# Patient Record
Sex: MALE | Race: WHITE | NOT HISPANIC OR LATINO | Employment: OTHER | URBAN - METROPOLITAN AREA
[De-identification: names, ages, dates, MRNs, and addresses within clinical notes are randomized per-mention and may not be internally consistent; named-entity substitution may affect disease eponyms.]

---

## 2022-08-29 ENCOUNTER — TELEPHONE (OUTPATIENT)
Dept: GASTROENTEROLOGY | Facility: CLINIC | Age: 77
End: 2022-08-29

## 2023-03-17 ENCOUNTER — PREP FOR SURGERY (OUTPATIENT)
Dept: SURGERY | Facility: SURGERY CENTER | Age: 78
End: 2023-03-17
Payer: MEDICARE

## 2023-03-17 DIAGNOSIS — Z12.11 ENCOUNTER FOR SCREENING FOR MALIGNANT NEOPLASM OF COLON: Primary | ICD-10-CM

## 2023-03-19 RX ORDER — SODIUM CHLORIDE, SODIUM LACTATE, POTASSIUM CHLORIDE, CALCIUM CHLORIDE 600; 310; 30; 20 MG/100ML; MG/100ML; MG/100ML; MG/100ML
30 INJECTION, SOLUTION INTRAVENOUS CONTINUOUS PRN
OUTPATIENT
Start: 2023-03-19

## 2023-04-06 PROBLEM — Z12.11 ENCOUNTER FOR SCREENING FOR MALIGNANT NEOPLASM OF COLON: Status: ACTIVE | Noted: 2023-04-06

## 2023-04-19 RX ORDER — METOPROLOL TARTRATE 50 MG/1
50 TABLET, FILM COATED ORAL ONCE
Status: ON HOLD | COMMUNITY
End: 2023-04-20 | Stop reason: SDUPTHER

## 2023-04-19 RX ORDER — ASPIRIN 81 MG/1
81 TABLET ORAL DAILY
COMMUNITY

## 2023-04-19 RX ORDER — LANOLIN ALCOHOL/MO/W.PET/CERES
1000 CREAM (GRAM) TOPICAL DAILY
COMMUNITY

## 2023-04-20 ENCOUNTER — HOSPITAL ENCOUNTER (OUTPATIENT)
Facility: SURGERY CENTER | Age: 78
Setting detail: HOSPITAL OUTPATIENT SURGERY
Discharge: HOME | End: 2023-04-20
Attending: INTERNAL MEDICINE | Admitting: INTERNAL MEDICINE
Payer: MEDICARE

## 2023-04-20 ENCOUNTER — ANESTHESIA (OUTPATIENT)
Dept: SURGERY | Facility: SURGERY CENTER | Age: 78
End: 2023-04-20
Payer: MEDICARE

## 2023-04-20 ENCOUNTER — ANESTHESIA EVENT (OUTPATIENT)
Dept: SURGERY | Facility: SURGERY CENTER | Age: 78
End: 2023-04-20
Payer: MEDICARE

## 2023-04-20 VITALS
HEART RATE: 72 BPM | HEIGHT: 71 IN | SYSTOLIC BLOOD PRESSURE: 120 MMHG | OXYGEN SATURATION: 99 % | TEMPERATURE: 98.2 F | BODY MASS INDEX: 27.5 KG/M2 | RESPIRATION RATE: 16 BRPM | DIASTOLIC BLOOD PRESSURE: 67 MMHG | WEIGHT: 196.4 LBS

## 2023-04-20 DIAGNOSIS — Z12.11 ENCOUNTER FOR SCREENING FOR MALIGNANT NEOPLASM OF COLON: ICD-10-CM

## 2023-04-20 PROCEDURE — 45385 COLONOSCOPY W/LESION REMOVAL: CPT | Performed by: INTERNAL MEDICINE

## 2023-04-20 PROCEDURE — 25010000002 PROPOFOL 10 MG/ML EMULSION: Performed by: ANESTHESIOLOGY

## 2023-04-20 PROCEDURE — 88305 TISSUE EXAM BY PATHOLOGIST: CPT | Performed by: INTERNAL MEDICINE

## 2023-04-20 DEVICE — REPLAY HEMOSTASIS CLIP, 16MM SPAN
Type: IMPLANTABLE DEVICE | Site: TRANSVERSE COLON | Status: FUNCTIONAL
Brand: REPLAY

## 2023-04-20 RX ORDER — PROPOFOL 10 MG/ML
VIAL (ML) INTRAVENOUS AS NEEDED
Status: DISCONTINUED | OUTPATIENT
Start: 2023-04-20 | End: 2023-04-20 | Stop reason: SURG

## 2023-04-20 RX ORDER — LIDOCAINE HYDROCHLORIDE 20 MG/ML
INJECTION, SOLUTION INFILTRATION; PERINEURAL AS NEEDED
Status: DISCONTINUED | OUTPATIENT
Start: 2023-04-20 | End: 2023-04-20 | Stop reason: SURG

## 2023-04-20 RX ORDER — SODIUM CHLORIDE, SODIUM LACTATE, POTASSIUM CHLORIDE, CALCIUM CHLORIDE 600; 310; 30; 20 MG/100ML; MG/100ML; MG/100ML; MG/100ML
30 INJECTION, SOLUTION INTRAVENOUS CONTINUOUS PRN
Status: DISCONTINUED | OUTPATIENT
Start: 2023-04-20 | End: 2023-04-20 | Stop reason: HOSPADM

## 2023-04-20 RX ORDER — MAGNESIUM HYDROXIDE 1200 MG/15ML
LIQUID ORAL AS NEEDED
Status: DISCONTINUED | OUTPATIENT
Start: 2023-04-20 | End: 2023-04-20 | Stop reason: HOSPADM

## 2023-04-20 RX ORDER — METOPROLOL SUCCINATE 50 MG/1
TABLET, EXTENDED RELEASE ORAL
COMMUNITY
Start: 2023-01-12

## 2023-04-20 RX ADMIN — LIDOCAINE HYDROCHLORIDE 60 MG: 20 INJECTION, SOLUTION INFILTRATION; PERINEURAL at 11:56

## 2023-04-20 RX ADMIN — PROPOFOL 140 MCG/KG/MIN: 10 INJECTION, EMULSION INTRAVENOUS at 11:56

## 2023-04-20 RX ADMIN — SODIUM CHLORIDE, POTASSIUM CHLORIDE, SODIUM LACTATE AND CALCIUM CHLORIDE 30 ML/HR: 600; 310; 30; 20 INJECTION, SOLUTION INTRAVENOUS at 11:08

## 2023-04-20 RX ADMIN — PROPOFOL 90 MG: 10 INJECTION, EMULSION INTRAVENOUS at 11:56

## 2023-04-20 NOTE — ANESTHESIA PREPROCEDURE EVALUATION
" Anesthesia Evaluation     Patient summary reviewed and Nursing notes reviewed   NPO Solid Status: > 8 hours  NPO Liquid Status: > 2 hours           Airway   Mallampati: II  TM distance: >3 FB  Neck ROM: full  No difficulty expected  Dental      Pulmonary    (+) sleep apnea,   Cardiovascular     (+) hypertension,       Neuro/Psych  GI/Hepatic/Renal/Endo      Musculoskeletal     Abdominal    Substance History      OB/GYN          Other   arthritis,                      Anesthesia Plan    ASA 2     MAC     (I have reviewed the patient's history and chart with the patient, including all pertinent laboratory results and imaging. I have explained the risks of anesthesia including but not limited to dental damage, corneal abrasion, nerve injury, MI, stroke, aspiration, and death. Patient has agreed to proceed.     Ht 180.3 cm (71\")   Wt 88.5 kg (195 lb)   BMI 27.20 kg/m²   )  intravenous induction     Anesthetic plan, risks, benefits, and alternatives have been provided, discussed and informed consent has been obtained with: patient.        CODE STATUS:       "

## 2023-04-20 NOTE — DISCHARGE INSTRUCTIONS
____6___ clip(s) was placed in your body on ____4/20/23__________________ to control bleeding in your GI tract.  If scheduled for an MRI, please inform the technologist you should have an X-ray prior to your MRI to confirm the metal clip has passed.

## 2023-04-20 NOTE — ANESTHESIA POSTPROCEDURE EVALUATION
Patient: Cortez Delgado    Procedure Summary     Date: 04/20/23 Room / Location[facility]    Anesthesia Start: 1154 Anesthesia Stop: 1239    Procedure: COLONOSCOPY WITH POLYPECTOMY Diagnosis:       Encounter for screening for malignant neoplasm of colon      (Encounter for screening for malignant neoplasm of colon [Z12.11])    Surgeons: David Ryder MD Provider: Kasie Diaz MD    Anesthesia Type: MAC ASA Status: 2          Anesthesia Type: MAC    Vitals  Vitals Value Taken Time   /67 04/20/23 1253   Temp 36.8 °C (98.2 °F) 04/20/23 1240   Pulse 72 04/20/23 1253   Resp 16 04/20/23 1253   SpO2 99 % 04/20/23 1253           Post Anesthesia Care and Evaluation    Patient location during evaluation: bedside  Patient participation: complete - patient participated  Level of consciousness: awake and alert  Pain management: adequate    Airway patency: patent  Anesthetic complications: No anesthetic complications  PONV Status: controlled  Cardiovascular status: acceptable  Respiratory status: acceptable  Hydration status: acceptable

## 2023-04-20 NOTE — H&P
No chief complaint on file.      HPI  Patient here today for a screening colonoscopy.  He had a positive Cologuard.         Problem List:    Patient Active Problem List   Diagnosis   • Encounter for screening for malignant neoplasm of colon       Medical History:    Past Medical History:   Diagnosis Date   • Arthritis    • Hypertension    • Sleep apnea         Social History:    Social History     Socioeconomic History   • Marital status:    Tobacco Use   • Smoking status: Never   • Smokeless tobacco: Never   Substance and Sexual Activity   • Drug use: Never       Family History: History reviewed. No pertinent family history.    Surgical History:   Past Surgical History:   Procedure Laterality Date   • FRACTURE SURGERY         No current facility-administered medications for this encounter.    Allergies:   Allergies   Allergen Reactions   • Penicillins Other (See Comments)     Please ask patient reaction        The following portions of the patient's history were reviewed by me and updated as appropriate: review of systems, allergies, current medications, past family history, past medical history, past social history, past surgical history and problem list.    There were no vitals filed for this visit.    PHYSICAL EXAM:    CONSTITUTIONAL:  today's vital signs reviewed by me  GASTROINTESTINAL: abdomen is soft nontender nondistended with normal active bowel sounds, no masses are appreciated    Assessment/ Plan  We will proceed with screening colonoscopy.    Risks and benefits as well as alternatives to endoscopic evaluation were explained to the patient and they voiced understanding and wish to proceed.  These risks include but are not limited to the risk of bleeding, perforation, adverse reaction to sedation, and missed lesions.  The patient was given the opportunity to ask questions prior to the endoscopic procedure.

## 2023-04-21 LAB
LAB AP CASE REPORT: NORMAL
LAB AP CLINICAL INFORMATION: NORMAL
PATH REPORT.FINAL DX SPEC: NORMAL
PATH REPORT.GROSS SPEC: NORMAL

## 2023-08-28 ENCOUNTER — TELEPHONE (OUTPATIENT)
Dept: GASTROENTEROLOGY | Facility: CLINIC | Age: 78
End: 2023-08-28
Payer: MEDICARE

## 2023-08-29 ENCOUNTER — TELEPHONE (OUTPATIENT)
Dept: GASTROENTEROLOGY | Facility: CLINIC | Age: 78
End: 2023-08-29
Payer: MEDICARE

## 2023-08-30 ENCOUNTER — PREP FOR SURGERY (OUTPATIENT)
Dept: SURGERY | Facility: SURGERY CENTER | Age: 78
End: 2023-08-30
Payer: MEDICARE

## 2023-08-30 DIAGNOSIS — Z12.11 ENCOUNTER FOR SCREENING FOR MALIGNANT NEOPLASM OF COLON: Primary | ICD-10-CM

## 2023-08-30 DIAGNOSIS — Z86.010 PERSONAL HISTORY OF COLONIC POLYPS: ICD-10-CM

## 2023-09-14 PROBLEM — Z86.0100 PERSONAL HISTORY OF COLONIC POLYPS: Status: ACTIVE | Noted: 2023-09-14

## 2023-09-14 PROBLEM — Z86.010 PERSONAL HISTORY OF COLONIC POLYPS: Status: ACTIVE | Noted: 2023-09-14

## 2023-10-30 ENCOUNTER — HOSPITAL ENCOUNTER (OUTPATIENT)
Facility: SURGERY CENTER | Age: 78
Setting detail: HOSPITAL OUTPATIENT SURGERY
Discharge: HOME | End: 2023-10-30
Attending: INTERNAL MEDICINE | Admitting: INTERNAL MEDICINE
Payer: MEDICARE

## 2023-10-30 ENCOUNTER — ANESTHESIA (OUTPATIENT)
Dept: SURGERY | Facility: SURGERY CENTER | Age: 78
End: 2023-10-30
Payer: MEDICARE

## 2023-10-30 ENCOUNTER — ANESTHESIA EVENT (OUTPATIENT)
Dept: SURGERY | Facility: SURGERY CENTER | Age: 78
End: 2023-10-30
Payer: MEDICARE

## 2023-10-30 VITALS
BODY MASS INDEX: 27.13 KG/M2 | DIASTOLIC BLOOD PRESSURE: 97 MMHG | TEMPERATURE: 98 F | HEIGHT: 71 IN | RESPIRATION RATE: 16 BRPM | WEIGHT: 193.8 LBS | SYSTOLIC BLOOD PRESSURE: 128 MMHG | OXYGEN SATURATION: 96 % | HEART RATE: 67 BPM

## 2023-10-30 DIAGNOSIS — Z86.010 PERSONAL HISTORY OF COLONIC POLYPS: ICD-10-CM

## 2023-10-30 DIAGNOSIS — Z12.11 ENCOUNTER FOR SCREENING FOR MALIGNANT NEOPLASM OF COLON: ICD-10-CM

## 2023-10-30 PROCEDURE — 45385 COLONOSCOPY W/LESION REMOVAL: CPT | Performed by: INTERNAL MEDICINE

## 2023-10-30 PROCEDURE — 25810000003 LACTATED RINGERS PER 1000 ML: Performed by: ANESTHESIOLOGY

## 2023-10-30 PROCEDURE — 25010000002 PROPOFOL 10 MG/ML EMULSION: Performed by: ANESTHESIOLOGY

## 2023-10-30 PROCEDURE — 88305 TISSUE EXAM BY PATHOLOGIST: CPT | Performed by: INTERNAL MEDICINE

## 2023-10-30 PROCEDURE — 25010000002 PROPOFOL 1000 MG/100ML EMULSION: Performed by: ANESTHESIOLOGY

## 2023-10-30 PROCEDURE — 25010000002 LIDOCAINE 1 % SOLUTION: Performed by: ANESTHESIOLOGY

## 2023-10-30 PROCEDURE — 45380 COLONOSCOPY AND BIOPSY: CPT | Performed by: INTERNAL MEDICINE

## 2023-10-30 RX ORDER — SODIUM CHLORIDE 0.9 % (FLUSH) 0.9 %
10 SYRINGE (ML) INJECTION AS NEEDED
Status: DISCONTINUED | OUTPATIENT
Start: 2023-10-30 | End: 2023-10-30 | Stop reason: HOSPADM

## 2023-10-30 RX ORDER — LIDOCAINE HYDROCHLORIDE 10 MG/ML
0.5 INJECTION, SOLUTION INFILTRATION; PERINEURAL ONCE AS NEEDED
Status: DISCONTINUED | OUTPATIENT
Start: 2023-10-30 | End: 2023-10-30 | Stop reason: HOSPADM

## 2023-10-30 RX ORDER — SODIUM CHLORIDE, SODIUM LACTATE, POTASSIUM CHLORIDE, CALCIUM CHLORIDE 600; 310; 30; 20 MG/100ML; MG/100ML; MG/100ML; MG/100ML
30 INJECTION, SOLUTION INTRAVENOUS CONTINUOUS PRN
Status: DISCONTINUED | OUTPATIENT
Start: 2023-10-30 | End: 2023-10-30 | Stop reason: HOSPADM

## 2023-10-30 RX ORDER — LIDOCAINE HYDROCHLORIDE 10 MG/ML
INJECTION, SOLUTION INFILTRATION; PERINEURAL AS NEEDED
Status: DISCONTINUED | OUTPATIENT
Start: 2023-10-30 | End: 2023-10-30 | Stop reason: SURG

## 2023-10-30 RX ORDER — SODIUM CHLORIDE, SODIUM LACTATE, POTASSIUM CHLORIDE, CALCIUM CHLORIDE 600; 310; 30; 20 MG/100ML; MG/100ML; MG/100ML; MG/100ML
1000 INJECTION, SOLUTION INTRAVENOUS CONTINUOUS
Status: DISCONTINUED | OUTPATIENT
Start: 2023-10-30 | End: 2023-10-30 | Stop reason: HOSPADM

## 2023-10-30 RX ORDER — PROPOFOL 10 MG/ML
INJECTION, EMULSION INTRAVENOUS AS NEEDED
Status: DISCONTINUED | OUTPATIENT
Start: 2023-10-30 | End: 2023-10-30 | Stop reason: SURG

## 2023-10-30 RX ORDER — SODIUM CHLORIDE, SODIUM LACTATE, POTASSIUM CHLORIDE, CALCIUM CHLORIDE 600; 310; 30; 20 MG/100ML; MG/100ML; MG/100ML; MG/100ML
INJECTION, SOLUTION INTRAVENOUS CONTINUOUS PRN
Status: DISCONTINUED | OUTPATIENT
Start: 2023-10-30 | End: 2023-10-30 | Stop reason: SURG

## 2023-10-30 RX ORDER — SODIUM CHLORIDE 0.9 % (FLUSH) 0.9 %
3 SYRINGE (ML) INJECTION EVERY 12 HOURS SCHEDULED
Status: DISCONTINUED | OUTPATIENT
Start: 2023-10-30 | End: 2023-10-30 | Stop reason: HOSPADM

## 2023-10-30 RX ADMIN — LIDOCAINE HYDROCHLORIDE 50 MG: 10 INJECTION, SOLUTION INFILTRATION; PERINEURAL at 11:51

## 2023-10-30 RX ADMIN — SODIUM CHLORIDE, SODIUM LACTATE, POTASSIUM CHLORIDE, AND CALCIUM CHLORIDE: .6; .31; .03; .02 INJECTION, SOLUTION INTRAVENOUS at 11:49

## 2023-10-30 RX ADMIN — PROPOFOL 80 MG: 10 INJECTION, EMULSION INTRAVENOUS at 11:51

## 2023-10-30 RX ADMIN — PROPOFOL 140 MCG/KG/MIN: 10 INJECTION, EMULSION INTRAVENOUS at 11:52

## 2023-10-30 NOTE — H&P
No chief complaint on file.      HPI  Patient today for screening colonoscopy.  He has a history of multiple, advanced colon polyps.         Problem List:    Patient Active Problem List   Diagnosis    Encounter for screening for malignant neoplasm of colon    Personal history of colonic polyps       Medical History:    Past Medical History:   Diagnosis Date    Arthritis     Colon polyps     Hypertension     Sleep apnea     cpap        Social History:    Social History     Socioeconomic History    Marital status:    Tobacco Use    Smoking status: Never    Smokeless tobacco: Never   Vaping Use    Vaping Use: Never used   Substance and Sexual Activity    Alcohol use: Never    Drug use: Never    Sexual activity: Defer       Family History: History reviewed. No pertinent family history.    Surgical History:   Past Surgical History:   Procedure Laterality Date    COLONOSCOPY      COLONOSCOPY W/ POLYPECTOMY N/A 04/20/2023    Procedure: COLONOSCOPY WITH POLYPECTOMY;  Surgeon: David Ryder MD;  Location: [facility] OR;  Service: Gastroenterology;  Laterality: N/A;  DIVERTICULOSIS, POLYPS X4, HEMORRHOIDS    FRACTURE SURGERY         No current facility-administered medications for this encounter.    Allergies:   Allergies   Allergen Reactions    Penicillins Other (See Comments)     Please ask patient reaction        The following portions of the patient's history were reviewed by me and updated as appropriate: review of systems, allergies, current medications, past family history, past medical history, past social history, past surgical history and problem list.    There were no vitals filed for this visit.    PHYSICAL EXAM:    CONSTITUTIONAL:  today's vital signs reviewed by me  GASTROINTESTINAL: abdomen is soft nontender nondistended with normal active bowel sounds, no masses are appreciated    Assessment/ Plan  We will proceed today with colonoscopy.    Risks and benefits as well as alternatives to endoscopic  evaluation were explained to the patient and they voiced understanding and wish to proceed.  These risks include but are not limited to the risk of bleeding, perforation, adverse reaction to sedation, and missed lesions.  The patient was given the opportunity to ask questions prior to the endoscopic procedure.

## 2023-10-30 NOTE — ANESTHESIA PREPROCEDURE EVALUATION
Anesthesia Evaluation     Patient summary reviewed and Nursing notes reviewed   NPO Solid Status: > 8 hours  NPO Liquid Status: > 2 hours           Airway   Mallampati: II  TM distance: >3 FB  Neck ROM: full  No difficulty expected  Dental      Pulmonary    (+) ,sleep apnea  Cardiovascular     (+) hypertension      Neuro/Psych  GI/Hepatic/Renal/Endo      Musculoskeletal     Abdominal    Substance History      OB/GYN          Other   arthritis,                       Anesthesia Plan    ASA 2     MAC     (I have reviewed the patient's history and chart with the patient, including all pertinent laboratory results and imaging. I have explained the risks of anesthesia including but not limited to dental damage, corneal abrasion, nerve injury, MI, stroke, aspiration, and death. Patient has agreed to proceed.       )  intravenous induction     Anesthetic plan, risks, benefits, and alternatives have been provided, discussed and informed consent has been obtained with: patient.        CODE STATUS:

## 2023-10-30 NOTE — ANESTHESIA POSTPROCEDURE EVALUATION
"Patient: Cortez Delgado    Procedure Summary       Date: 10/30/23 Room [facility]    Anesthesia Start: 1149 Anesthesia Stop: 1211    Procedure: COLONOSCOPY TO CECUM Diagnosis:       Encounter for screening for malignant neoplasm of colon      Personal history of colonic polyps      (Encounter for screening for malignant neoplasm of colon [Z12.11])      (Personal history of colonic polyps [Z86.010])    Surgeons: David Ryder MD Provider: Elizabeth Green MD    Anesthesia Type: MAC ASA Status: 2            Anesthesia Type: MAC    Vitals  Vitals Value Taken Time   /76 10/30/23 1221   Temp 36.7 °C (98 °F) 10/30/23 1211   Pulse 50 10/30/23 1221   Resp 16 10/30/23 1221   SpO2 95 % 10/30/23 1221           Post Anesthesia Care and Evaluation    Patient location during evaluation: bedside  Patient participation: complete - patient participated  Level of consciousness: awake  Pain management: adequate    Airway patency: patent  Anesthetic complications: No anesthetic complications    Cardiovascular status: acceptable  Respiratory status: acceptable  Hydration status: acceptable    Comments: /76   Pulse 50   Temp 36.7 °C (98 °F) (Temporal)   Resp 16   Ht 180.3 cm (71\")   Wt 87.9 kg (193 lb 12.8 oz)   SpO2 95%   BMI 27.03 kg/m²     "

## 2023-11-08 ENCOUNTER — TELEPHONE (OUTPATIENT)
Dept: GASTROENTEROLOGY | Facility: CLINIC | Age: 78
End: 2023-11-08
Payer: MEDICARE

## 2024-02-27 ENCOUNTER — TELEPHONE (OUTPATIENT)
Dept: SPORTS MEDICINE | Facility: CLINIC | Age: 79
End: 2024-02-27
Payer: MEDICARE

## 2024-02-27 NOTE — TELEPHONE ENCOUNTER
Patient came to office with disc from [facility] says xrays done 2/13/24. Brought [facility] Report as well. Placed both in Dr. White's basked at [facility] location.

## 2024-03-14 ENCOUNTER — OFFICE VISIT (OUTPATIENT)
Dept: ORTHOPEDIC SURGERY | Facility: CLINIC | Age: 79
End: 2024-03-14
Payer: MEDICARE

## 2024-03-14 VITALS
DIASTOLIC BLOOD PRESSURE: 70 MMHG | HEIGHT: 71 IN | SYSTOLIC BLOOD PRESSURE: 146 MMHG | WEIGHT: 199 LBS | BODY MASS INDEX: 27.86 KG/M2

## 2024-03-14 DIAGNOSIS — M19.011 PRIMARY OSTEOARTHRITIS OF RIGHT SHOULDER: Primary | ICD-10-CM

## 2024-03-14 PROCEDURE — 99203 OFFICE O/P NEW LOW 30 MIN: CPT | Performed by: ORTHOPAEDIC SURGERY

## 2024-03-14 PROCEDURE — 20610 DRAIN/INJ JOINT/BURSA W/O US: CPT | Performed by: ORTHOPAEDIC SURGERY

## 2024-03-14 PROCEDURE — 1160F RVW MEDS BY RX/DR IN RCRD: CPT | Performed by: ORTHOPAEDIC SURGERY

## 2024-03-14 PROCEDURE — 1159F MED LIST DOCD IN RCRD: CPT | Performed by: ORTHOPAEDIC SURGERY

## 2024-03-14 RX ADMIN — LIDOCAINE HYDROCHLORIDE 4 ML: 10 INJECTION, SOLUTION EPIDURAL; INFILTRATION; INTRACAUDAL; PERINEURAL at 11:41

## 2024-03-14 RX ADMIN — TRIAMCINOLONE ACETONIDE 80 MG: 40 INJECTION, SUSPENSION INTRA-ARTICULAR; INTRAMUSCULAR at 11:41

## 2024-03-14 NOTE — PROGRESS NOTES
Subjective:     Patient ID: Cortez Delgado is a 78 y.o. male.    Chief Complaint:  Right shoulder pain, new patient    History of Present Illness  Cortez Delgado presents to clinic today for evaluation of right shoulder pain.    Right shoulder  The pain has been present for greater than 20 years, localized to the anterior and posterior glenohumeral joint line. No significant crepitus on range of motion. It is worse with overhead lifting, pushing, pulling activities. He denies any numbness or tingling. Mild radiation of pain down the lateral aspect of the upper extremity. He has had mild improvement with anti-inflammatory medications, activity modification. He has had no prior injections. He notes significant crepitus on range of motion. He denies any fevers, chills, or sweats.     Social History     Occupational History    Not on file   Tobacco Use    Smoking status: Never     Passive exposure: Never    Smokeless tobacco: Never   Vaping Use    Vaping status: Never Used   Substance and Sexual Activity    Alcohol use: Never    Drug use: Never    Sexual activity: Defer      Past Medical History:   Diagnosis Date    Arthritis     Colon polyps     Fracture of wrist     Approx 12 yrs ago    Hypertension     Knee swelling     Periarthritis of shoulder     Sleep apnea     cpap    Tendinitis of knee      Past Surgical History:   Procedure Laterality Date    COLONOSCOPY      COLONOSCOPY N/A 10/30/2023    Procedure: COLONOSCOPY TO CECUM;  Surgeon: David Ryder MD;  [place] [facility] [place]  Service: Gastroenterology;  Laterality: N/A;  diverticulosis, polyps,hemorrhoids    COLONOSCOPY W/ POLYPECTOMY N/A 04/20/2023    Procedure: COLONOSCOPY WITH POLYPECTOMY;  Surgeon: David Ryder MD;  [place] [facility] [place];  Service: Gastroenterology;  Laterality: N/A;  DIVERTICULOSIS, POLYPS X4, HEMORRHOIDS    FRACTURE SURGERY      WRIST SURGERY Left     Approx 12 yrs ago       History reviewed. No pertinent family  "history.      Review of Systems        Objective:  Vitals:    03/14/24 1041   BP: 146/70   Weight: 90.3 kg (199 lb)   Height: 180.3 cm (71\")         03/14/24  1041   Weight: 90.3 kg (199 lb)     Body mass index is 27.75 kg/m².  Physical Exam    Vital signs reviewed.   General: No acute distress, alert and oriented  Eyes: conjunctiva clear; pupils equally round and reactive  ENT: external ears and nose atraumatic; oropharynx clear  CV: no peripheral edema  Resp: normal respiratory effort  Skin: no rashes or wounds; normal turgor  Psych: mood and affect appropriate; recent and remote memory intact        Ortho Exam     Right shoulder:  Active and passive forward flexion 140 degrees  4/5 strength  Active and passive external rotation 30 degrees  4/5 strength, internal rotation L2  4/5 strength, Belly press test negative.   Maximal tenderness to palpation anterior, posterior glenohumeral joint.   Almost significant crepitus on range of motion.   Positive sensation to light touch in all distributions, right hand, symmetric to left.   Brisk capillary refill to all digits. 2+ radial pulse, right wrist.    Imaging:            Review of outside x-rays right shoulder and humerus including review of imaging as well as radiology report indicates end-stage arthritic change of the right glenohumeral joint with significant reactive osteophyte formation, moderate humeral head elevation.      Assessment:        1. Primary osteoarthritis of right shoulder           Plan:     Large Joint Arthrocentesis: R glenohumeral  Date/Time: 3/14/2024 11:41 AM  Consent given by: patient  Site marked: site marked  Timeout: Immediately prior to procedure a time out was called to verify the correct patient, procedure, equipment, support staff and site/side marked as required   Supporting Documentation  Indications: pain   Procedure Details  Location: shoulder - R glenohumeral  Preparation: Patient was prepped and draped in the usual sterile " fashion  Needle size: 22 G  Approach: anterior  Medications administered: 4 mL lidocaine PF 1% 1 %; 80 mg triamcinolone acetonide 40 MG/ML  Patient tolerance: patient tolerated the procedure well with no immediate complications        The patient would like to proceed with cortisone injection today to the right shoulder. Recommended limited use of affected extremity for the next 24 hours to only essential activities other than work on general active and passive motion. Recommended supplementing with ice and soft tissue massage. Discussed with the patient that they should see results in 5-7 days, if no improvement in 5-6 weeks I have asked them to call the office to review other options. Patient should call the office immediately if they notice redness, warmth, fevers, chills, or residual numbness or tingling for greater than 6 hours after injection.      Discussed treatment options at length with the patient.  Given his advanced degenerative changes to his right shoulder, we discussed options including but not limited to injection, observation, physical therapy, and reverse shoulder arthroplasty.   At this point in time, he would like to proceed with an injection today.   He will continue home exercise, work on range of motion, strength as tolerated.   All questions answered.    Follow-up:  The patient will follow up as needed based on results from injection.        Cortez Delgado was in agreement with plan and had all questions answered.     Orders:  Orders Placed This Encounter   Procedures    Large Joint Arthrocentesis: R glenohumeral       Medications:  No orders of the defined types were placed in this encounter.      Followup:  Return if symptoms worsen or fail to improve.    Diagnoses and all orders for this visit:    1. Primary osteoarthritis of right shoulder (Primary)    Other orders  -     Large Joint Arthrocentesis: R glenohumeral          Dictated utilizing Dragon dictation     Transcribed from  ambient dictation for Thanh White MD by Trudy Esteban.  03/14/24   12:54 EDT    Patient or patient representative verbalized consent to the visit recording.  I have personally performed the services described in this document as transcribed by the above individual, and it is both accurate and complete.

## 2024-03-18 RX ORDER — TRIAMCINOLONE ACETONIDE 40 MG/ML
80 INJECTION, SUSPENSION INTRA-ARTICULAR; INTRAMUSCULAR
Status: COMPLETED | OUTPATIENT
Start: 2024-03-14 | End: 2024-03-14

## 2024-03-18 RX ORDER — LIDOCAINE HYDROCHLORIDE 10 MG/ML
4 INJECTION, SOLUTION EPIDURAL; INFILTRATION; INTRACAUDAL; PERINEURAL
Status: COMPLETED | OUTPATIENT
Start: 2024-03-14 | End: 2024-03-14

## 2024-05-17 ENCOUNTER — APPOINTMENT (OUTPATIENT)
Dept: GENERAL RADIOLOGY | Facility: HOSPITAL | Age: 79
End: 2024-05-17
Payer: MEDICARE

## 2024-05-17 ENCOUNTER — HOSPITAL ENCOUNTER (EMERGENCY)
Facility: HOSPITAL | Age: 79
Discharge: HOME | End: 2024-05-17
Attending: EMERGENCY MEDICINE
Payer: MEDICARE

## 2024-05-17 VITALS
WEIGHT: 196 LBS | BODY MASS INDEX: 27.44 KG/M2 | DIASTOLIC BLOOD PRESSURE: 93 MMHG | RESPIRATION RATE: 15 BRPM | SYSTOLIC BLOOD PRESSURE: 162 MMHG | HEIGHT: 71 IN | OXYGEN SATURATION: 98 % | TEMPERATURE: 98.8 F | HEART RATE: 52 BPM

## 2024-05-17 DIAGNOSIS — I10 ELEVATED BLOOD PRESSURE READING WITH DIAGNOSIS OF HYPERTENSION: ICD-10-CM

## 2024-05-17 DIAGNOSIS — R07.9 CHEST PAIN, UNSPECIFIED TYPE: Primary | ICD-10-CM

## 2024-05-17 LAB
ALBUMIN SERPL-MCNC: 4.4 G/DL (ref 3.5–5.2)
ALBUMIN/GLOB SERPL: 1.8 G/DL
ALP SERPL-CCNC: 108 U/L (ref 39–117)
ALT SERPL W P-5'-P-CCNC: 15 U/L (ref 1–41)
ANION GAP SERPL CALCULATED.3IONS-SCNC: 8.5 MMOL/L (ref 5–15)
AST SERPL-CCNC: 19 U/L (ref 1–40)
BASOPHILS # BLD AUTO: 0.05 10*3/MM3 (ref 0–0.2)
BASOPHILS NFR BLD AUTO: 0.8 % (ref 0–1.5)
BILIRUB SERPL-MCNC: 0.5 MG/DL (ref 0–1.2)
BUN SERPL-MCNC: 17 MG/DL (ref 8–23)
BUN/CREAT SERPL: 16.8 (ref 7–25)
CALCIUM SPEC-SCNC: 9.8 MG/DL (ref 8.6–10.5)
CHLORIDE SERPL-SCNC: 103 MMOL/L (ref 98–107)
CO2 SERPL-SCNC: 27.5 MMOL/L (ref 22–29)
CREAT SERPL-MCNC: 1.01 MG/DL (ref 0.76–1.27)
DEPRECATED RDW RBC AUTO: 43.5 FL (ref 37–54)
EGFRCR SERPLBLD CKD-EPI 2021: 75.7 ML/MIN/1.73
EOSINOPHIL # BLD AUTO: 0.13 10*3/MM3 (ref 0–0.4)
EOSINOPHIL NFR BLD AUTO: 2 % (ref 0.3–6.2)
ERYTHROCYTE [DISTWIDTH] IN BLOOD BY AUTOMATED COUNT: 12.5 % (ref 12.3–15.4)
GEN 5 2HR TROPONIN T REFLEX: 16 NG/L
GLOBULIN UR ELPH-MCNC: 2.4 GM/DL
GLUCOSE SERPL-MCNC: 93 MG/DL (ref 65–99)
HCT VFR BLD AUTO: 41.4 % (ref 37.5–51)
HGB BLD-MCNC: 14.1 G/DL (ref 13–17.7)
HOLD SPECIMEN: NORMAL
HOLD SPECIMEN: NORMAL
IMM GRANULOCYTES # BLD AUTO: 0.01 10*3/MM3 (ref 0–0.05)
IMM GRANULOCYTES NFR BLD AUTO: 0.2 % (ref 0–0.5)
LYMPHOCYTES # BLD AUTO: 1.67 10*3/MM3 (ref 0.7–3.1)
LYMPHOCYTES NFR BLD AUTO: 25.1 % (ref 19.6–45.3)
MCH RBC QN AUTO: 32.1 PG (ref 26.6–33)
MCHC RBC AUTO-ENTMCNC: 34.1 G/DL (ref 31.5–35.7)
MCV RBC AUTO: 94.3 FL (ref 79–97)
MONOCYTES # BLD AUTO: 0.64 10*3/MM3 (ref 0.1–0.9)
MONOCYTES NFR BLD AUTO: 9.6 % (ref 5–12)
NEUTROPHILS NFR BLD AUTO: 4.16 10*3/MM3 (ref 1.7–7)
NEUTROPHILS NFR BLD AUTO: 62.3 % (ref 42.7–76)
PLATELET # BLD AUTO: 171 10*3/MM3 (ref 140–450)
PMV BLD AUTO: 10.1 FL (ref 6–12)
POTASSIUM SERPL-SCNC: 3.8 MMOL/L (ref 3.5–5.2)
PROT SERPL-MCNC: 6.8 G/DL (ref 6–8.5)
RBC # BLD AUTO: 4.39 10*6/MM3 (ref 4.14–5.8)
SODIUM SERPL-SCNC: 139 MMOL/L (ref 136–145)
TROPONIN T DELTA: 3 NG/L
TROPONIN T SERPL HS-MCNC: 13 NG/L
WBC NRBC COR # BLD AUTO: 6.66 10*3/MM3 (ref 3.4–10.8)
WHOLE BLOOD HOLD COAG: NORMAL
WHOLE BLOOD HOLD SPECIMEN: NORMAL

## 2024-05-17 PROCEDURE — 96374 THER/PROPH/DIAG INJ IV PUSH: CPT

## 2024-05-17 PROCEDURE — 25010000002 HYDRALAZINE PER 20 MG: Performed by: PHYSICIAN ASSISTANT

## 2024-05-17 PROCEDURE — 85025 COMPLETE CBC W/AUTO DIFF WBC: CPT | Performed by: PHYSICIAN ASSISTANT

## 2024-05-17 PROCEDURE — 84484 ASSAY OF TROPONIN QUANT: CPT | Performed by: PHYSICIAN ASSISTANT

## 2024-05-17 PROCEDURE — 71046 X-RAY EXAM CHEST 2 VIEWS: CPT

## 2024-05-17 PROCEDURE — 93005 ELECTROCARDIOGRAM TRACING: CPT | Performed by: EMERGENCY MEDICINE

## 2024-05-17 PROCEDURE — 80053 COMPREHEN METABOLIC PANEL: CPT | Performed by: PHYSICIAN ASSISTANT

## 2024-05-17 PROCEDURE — 99284 EMERGENCY DEPT VISIT MOD MDM: CPT

## 2024-05-17 PROCEDURE — 36415 COLL VENOUS BLD VENIPUNCTURE: CPT

## 2024-05-17 PROCEDURE — 99284 EMERGENCY DEPT VISIT MOD MDM: CPT | Performed by: STUDENT IN AN ORGANIZED HEALTH CARE EDUCATION/TRAINING PROGRAM

## 2024-05-17 RX ORDER — ASPIRIN 325 MG
325 TABLET ORAL ONCE
Status: DISCONTINUED | OUTPATIENT
Start: 2024-05-17 | End: 2024-05-17

## 2024-05-17 RX ORDER — SODIUM CHLORIDE 0.9 % (FLUSH) 0.9 %
10 SYRINGE (ML) INJECTION AS NEEDED
Status: DISCONTINUED | OUTPATIENT
Start: 2024-05-17 | End: 2024-05-18 | Stop reason: HOSPADM

## 2024-05-17 RX ORDER — HYDRALAZINE HYDROCHLORIDE 20 MG/ML
10 INJECTION INTRAMUSCULAR; INTRAVENOUS ONCE
Status: COMPLETED | OUTPATIENT
Start: 2024-05-17 | End: 2024-05-17

## 2024-05-17 RX ORDER — ASPIRIN 81 MG/1
243 TABLET, CHEWABLE ORAL ONCE
Status: COMPLETED | OUTPATIENT
Start: 2024-05-17 | End: 2024-05-17

## 2024-05-17 RX ADMIN — HYDRALAZINE HYDROCHLORIDE 10 MG: 20 INJECTION INTRAMUSCULAR; INTRAVENOUS at 22:07

## 2024-05-17 RX ADMIN — ASPIRIN 243 MG: 81 TABLET, CHEWABLE ORAL at 20:26

## 2024-05-18 LAB
QT INTERVAL: 411 MS
QTC INTERVAL: 401 MS

## 2024-05-18 NOTE — FSED PROVIDER NOTE
Subjective   History of Present Illness  Patient is a 78-year-old gentleman who presents the emergency room with chest pain that started a little after lunch around noon while he was hanging around the house.  It is substernal does not radiate.  Nothing makes it better or worse.  He is not having any dyspnea.  He has chronic leg swelling which is worse than the right which he says is baseline.  He has not been sick lately.  He denies any abdominal issues.  He takes metoprolol 50 mg extended release once a day and his blood pressure is usually pretty good.  He checked it a couple days ago and was in the 140s.  Today it was 225/110.  He says he has not missed any doses of medication.  He also takes aspirin 81 mg, niacin and vitamins.  He does not have a history of CHF.              Review of Systems   Constitutional: Negative.    HENT: Negative.     Eyes: Negative.    Respiratory: Negative.     Cardiovascular:  Positive for chest pain. Negative for palpitations and leg swelling.   Gastrointestinal: Negative.    Genitourinary: Negative.    Musculoskeletal: Negative.    Neurological: Negative.    Psychiatric/Behavioral: Negative.     All other systems reviewed and are negative.      Past Medical History:   Diagnosis Date    Arthritis     Colon polyps     Fracture of wrist     Approx 12 yrs ago    Hypertension     Knee swelling     Periarthritis of shoulder     Sleep apnea     cpap    Tendinitis of knee        Allergies   Allergen Reactions    Penicillins Other (See Comments) and Rash     Please ask patient reaction       Past Surgical History:   Procedure Laterality Date    COLONOSCOPY      COLONOSCOPY N/A 10/30/2023    Procedure: COLONOSCOPY TO CECUM;  Surgeon: David Ryder MD;  [place] [facility] [place];  Service: Gastroenterology;  Laterality: N/A;  diverticulosis, polyps,hemorrhoids    COLONOSCOPY W/ POLYPECTOMY N/A 04/20/2023    Procedure: COLONOSCOPY WITH POLYPECTOMY;  Surgeon: David Ryder MD;   Location: [facility] OR;  Service: Gastroenterology;  Laterality: N/A;  DIVERTICULOSIS, POLYPS X4, HEMORRHOIDS    FRACTURE SURGERY      WRIST SURGERY Left     Approx 12 yrs ago       History reviewed. No pertinent family history.    Social History     Socioeconomic History    Marital status:    Tobacco Use    Smoking status: Never     Passive exposure: Never    Smokeless tobacco: Never   Vaping Use    Vaping status: Never Used   Substance and Sexual Activity    Alcohol use: Never    Drug use: Never    Sexual activity: Defer           Objective   Physical Exam  Vitals reviewed.   Constitutional:       Appearance: He is well-developed.   Cardiovascular:      Rate and Rhythm: Normal rate and regular rhythm.      Pulses:           Dorsalis pedis pulses are 3+ on the right side and 3+ on the left side.        Posterior tibial pulses are 1+ on the right side and 2+ on the left side.      Heart sounds: Normal heart sounds.      Comments: Heart rate about 60  Pulmonary:      Effort: Pulmonary effort is normal. No tachypnea.      Breath sounds: Normal breath sounds.   Chest:      Chest wall: Edema (Bilateral lower extremity edema worse on right which patient says is baseline) present. No mass or tenderness.   Abdominal:      Palpations: Abdomen is soft.      Tenderness: There is no abdominal tenderness. There is no guarding.   Skin:     General: Skin is warm and dry.      Capillary Refill: Capillary refill takes less than 2 seconds.   Neurological:      General: No focal deficit present.      Mental Status: He is alert.   Psychiatric:         Mood and Affect: Mood normal.         ECG 12 Lead ED Triage Standing Order; Chest Pain      Date/Time: 5/17/2024 7:52 PM    Performed by: fOelia Vásquez PA-C  Authorized by: Ofelia Vásquez PA-C  Interpreted by ED physician  Comparison: not compared with previous ECG   Previous ECG: no previous ECG available  Rhythm: sinus bradycardia  Ectopy: atrial premature contractions  Rate:  bradycardic  BPM: 57  QRS axis: normal  Conduction: left bundle branch block  Other: no other findings               ED Course  ED Course as of 05/17/24 2235   Fri May 17, 2024   2234 Troponin negative x 2, all lab work unremarkable.  Blood pressure has improved with hydralazine and patient is feeling well.  Discussed the plan for discharge with PCP follow-up and importance of following up for further outpatient management of his hypertension.  Patient understands and agrees, all questions answered. [JF]      ED Course User Index  [JF] Brad Spears MD                HEART Score: 4                            Medical Decision Making  Patient presents with a chest pain that started around noon today.  It substernal and there is nothing makes it better or better or worse.  He has chronic leg edema worse on the right leg which is baseline.  He is not having any respiratory issues.  Not having any abdominal issues.  Has not been sick lately.  First troponin is elevated at 13 and will be repeated.  He takes aspirin 81 mg and he was given another 243 mg of aspirin here.  His blood pressure is elevated at 225/110.  It came down on its own to 180.  It then started going back up to 190 and again at 188.  At that time I gave him hydralazine 10 mg IV.    EKG is reassuring as are the other labs.  Chest x-ray shows atelectasis without acute abnormality.    Second troponin and EKG are pending and care at shift change to Dr. Spears.    Problems Addressed:  Chest pain, unspecified type: complicated acute illness or injury  Elevated blood pressure reading with diagnosis of hypertension: complicated acute illness or injury    Amount and/or Complexity of Data Reviewed  Labs: ordered.     Details: All labs unremarkable, troponin negative x 2.  Radiology: ordered.     Details: Chest x-ray shows no acute pulmonary process based on my interpretation.  ECG/medicine tests: ordered and independent interpretation performed.    Risk  OTC  drugs.  Prescription drug management.        Final diagnoses:   Chest pain, unspecified type   Elevated blood pressure reading with diagnosis of hypertension       ED Disposition  ED Disposition       ED Disposition   Discharge    Condition   Stable    Comment   --               Madi Marroquin MD  [address] [facility] [address] [phone]    Schedule an appointment as soon as possible for a visit in 3 days  For re-evaluation         Medication List      No changes were made to your prescriptions during this visit.

## 2024-09-26 ENCOUNTER — OFFICE VISIT (OUTPATIENT)
Dept: ORTHOPEDIC SURGERY | Facility: CLINIC | Age: 79
End: 2024-09-26
Payer: MEDICARE

## 2024-09-26 VITALS
WEIGHT: 196.2 LBS | BODY MASS INDEX: 27.47 KG/M2 | HEIGHT: 71 IN | DIASTOLIC BLOOD PRESSURE: 84 MMHG | SYSTOLIC BLOOD PRESSURE: 136 MMHG

## 2024-09-26 DIAGNOSIS — M17.0 PRIMARY OSTEOARTHRITIS OF BOTH KNEES: ICD-10-CM

## 2024-09-26 DIAGNOSIS — M19.011 PRIMARY OSTEOARTHRITIS OF RIGHT SHOULDER: ICD-10-CM

## 2024-09-26 DIAGNOSIS — M25.562 PAIN IN BOTH KNEES, UNSPECIFIED CHRONICITY: Primary | ICD-10-CM

## 2024-09-26 DIAGNOSIS — M17.11 PRIMARY OSTEOARTHRITIS OF RIGHT KNEE: ICD-10-CM

## 2024-09-26 DIAGNOSIS — M25.561 PAIN IN BOTH KNEES, UNSPECIFIED CHRONICITY: Primary | ICD-10-CM

## 2024-09-26 PROCEDURE — 1160F RVW MEDS BY RX/DR IN RCRD: CPT | Performed by: ORTHOPAEDIC SURGERY

## 2024-09-26 PROCEDURE — 20610 DRAIN/INJ JOINT/BURSA W/O US: CPT | Performed by: ORTHOPAEDIC SURGERY

## 2024-09-26 PROCEDURE — 99214 OFFICE O/P EST MOD 30 MIN: CPT | Performed by: ORTHOPAEDIC SURGERY

## 2024-09-26 PROCEDURE — 1159F MED LIST DOCD IN RCRD: CPT | Performed by: ORTHOPAEDIC SURGERY

## 2024-09-26 RX ORDER — VALSARTAN 80 MG/1
80 TABLET ORAL DAILY
COMMUNITY
Start: 2024-08-27 | End: 2025-08-27

## 2024-09-26 RX ORDER — ROSUVASTATIN CALCIUM 5 MG/1
5 TABLET, COATED ORAL DAILY
COMMUNITY

## 2024-09-26 RX ADMIN — TRIAMCINOLONE ACETONIDE 80 MG: 40 INJECTION, SUSPENSION INTRA-ARTICULAR; INTRAMUSCULAR at 10:08

## 2024-09-26 RX ADMIN — LIDOCAINE HYDROCHLORIDE 4 ML: 10 INJECTION, SOLUTION EPIDURAL; INFILTRATION; INTRACAUDAL; PERINEURAL at 10:08

## 2024-09-26 NOTE — PROGRESS NOTES
Subjective:     Patient ID: Cortez Delgado is a 78 y.o. male.    Chief Complaint:  Bilateral knee pain, new issue    Follow-up right shoulder pain, DJD  Last glenohumeral injection-3/14/2024    History of Present Illness  History of Present Illness  Conner returns to clinic today follow-up evaluation in regards to his right shoulder as well as new evaluation regards to his bilateral knee pain.    He states in regards to his shoulder his last injection worked very well but he started have increasing pain particular the anterior posterior glenohumeral joint line with associated crepitus that is been worse over the last 4 to 6 weeks worse with overhead positioning lifting pushing and pulling activities.  Denies any numbness or tingling, denies any fevers chills or sweats.    In regards to his knees he notes that his pain has been increasing significantly over the last 6 months he has failed previous anti-inflammatory medications and intra-articular injections as well as home exercises working on range of motion.  His right knee is worse than his left localizes pain to the anterior medial aspect of the knee worse with prolonged walking, weightbearing, sitting, getting up from a seated position, and deep flexion activities.  Rates pain as a 9-10 out of 10, aching in nature, mild radiation of pain on the medial aspect of the leg but not below the mid leg level.  Denies any fevers chills or sweats.  Denies any associated hip or groin pain.  Minimal improvement with over-the-counter soft bracing.     Social History     Occupational History    Not on file   Tobacco Use    Smoking status: Never     Passive exposure: Never    Smokeless tobacco: Never   Vaping Use    Vaping status: Never Used   Substance and Sexual Activity    Alcohol use: Never    Drug use: Never    Sexual activity: Defer      Past Medical History:   Diagnosis Date    Arthritis     Colon polyps     Fracture of wrist     Approx 12 yrs ago    Frozen shoulder   "   Hypertension     Knee swelling     Periarthritis of shoulder     Sleep apnea     cpap    Tendinitis of knee      Past Surgical History:   Procedure Laterality Date    COLONOSCOPY      COLONOSCOPY N/A 10/30/2023    Procedure: COLONOSCOPY TO CECUM;  Surgeon: David Ryder MD;  [place] [facility] [place]  Service: Gastroenterology;  Laterality: N/A;  diverticulosis, polyps,hemorrhoids    COLONOSCOPY W/ POLYPECTOMY N/A 04/20/2023    Procedure: COLONOSCOPY WITH POLYPECTOMY;  Surgeon: David Ryder MD;  [place] [facility] [place];  Service: Gastroenterology;  Laterality: N/A;  DIVERTICULOSIS, POLYPS X4, HEMORRHOIDS    FRACTURE SURGERY      WRIST SURGERY Left     Approx 12 yrs ago       History reviewed. No pertinent family history.      Review of Systems        Objective:  Vitals:    09/26/24 0915   BP: 136/84   Weight: 89 kg (196 lb 3.2 oz)   Height: 180.3 cm (71\")         09/26/24 0915   Weight: 89 kg (196 lb 3.2 oz)     Body mass index is 27.36 kg/m².  Physical Exam    Vital signs reviewed.   General: No acute distress, alert and oriented  Eyes: conjunctiva clear; pupils equally round and reactive  ENT: external ears and nose atraumatic; oropharynx clear  CV: no peripheral edema  Resp: normal respiratory effort  Skin: no rashes or wounds; normal turgor  Psych: mood and affect appropriate; recent and remote memory intact          Physical Exam         Right shoulder-active and passive forward flexion 145 degrees 4-5 strength, active and passive external rotation 30 degrees 4 out of 5 strength, internal rotation to L2 with 4 out of 5 strength on belly press test.  Maximal tenderness palpation anterior posterior glenohumeral joint line with significant crepitus.  Negative drop arm test, positive empty can test, positive Morejon and Neer's, negative external rotation lag sign.    Bilateral knees-ask range of motion 5 to 125 degrees, 4+ out of 5 strength on flexion extension, significant varus alignment, " maximal tenderness palpation medial joint line as well as medial lateral patellar facet with positive active patella compression test, positive Long exam with pain, no click.  Moderate effusion.  Grade 1A Lachman, negative anterior posterior drawer.  No hip pain on logroll Stinchfield exam, negative straight leg raise bilateral lower extremities.    Imaging:  Bilateral Knee X-Ray  Indication: Pain    AP, Lateral, and [place] views    Findings:  No fracture  No bony lesion  Normal soft tissues  Advanced tricompartmental osteoarthritis with bone-on-bone articulation of all 3 compartments with overall varus alignment and moderate bony erosion of the medial tibial plateau and large reactive osteophyte formation noted in all 3 compartments as well.    No prior studies were available for comparison.    Assessment:        1. Pain in both knees, unspecified chronicity    2. Primary osteoarthritis of right shoulder    3. Primary osteoarthritis of both knees           Plan:      Large Joint Arthrocentesis: R glenohumeral  Date/Time: 9/26/2024 10:08 AM  Consent given by: patient  Site marked: site marked  Timeout: Immediately prior to procedure a time out was called to verify the correct patient, procedure, equipment, support staff and site/side marked as required   Supporting Documentation  Indications: pain   Procedure Details  Location: shoulder - R glenohumeral  Preparation: Patient was prepped and draped in the usual sterile fashion  Needle size: 22 G  Approach: anterior  Medications administered: 4 mL lidocaine PF 1% 1 %; 80 mg triamcinolone acetonide 40 MG/ML  Patient tolerance: patient tolerated the procedure well with no immediate complications        Assessment & Plan  Discussed treatment options at length with patient in regards to his shoulder and given prior success with intra-articular injection he would like to proceed with that again today with a right shoulder glenohumeral joint.  He will continue working on  home exercises for range of motion.  We did discuss option for shoulder arthroplasty but he wants to hold off on that at this time.    Patient would like to proceed with cortisone injection today to the right shoulder glenohumeral joint. Recommended limited use of affected extremity for the next 24 hours to only essential activites other than work on general active and passive motion. Recommended supplementing with ice and soft tissue massage. Discussed with patient that they should see results in 5-7 days, if no improvement in 5-6 weeks I have asked them to call the office to review other options. Patient should call office immediately if they notice redness, warmth, fevers, chills, or residual numbness or tingling for greater than 6 hours after injection.     Patient has noted that he is having increasing pain particular in his right knee even with basic activities of daily living.  His right is worse than his left hand he has failed a significant number of conservative treatments as noted above in HPI with pain on a daily basis that is severe.  Given this and his advanced arthritic wear on imaging, we discussed options and he would like to proceed with right total knee arthroplasty at this time.    I reviewed anatomy and a model of a total knee arthroplasty, as well as typical postoperative recovery of 6-12 months before maximal recovery, and possible need for rehabilitation stay after hospitalization. We also discussed risks, benefits, and alternatives of procedure with risks including but not limited to neurovascular damage, bleeding, infection, malalignment, chronic pian, failure of implants, osteolysis, loosening of implants, loss of motion, weakness, stiffness, instability, DVT, pulmonary embolus, death, stroke, complex regional pain syndrome, myocardial infarction, and need for additional procedures. Patient understood all these and had all questions answered before consenting to the procedure. No  guarantees were given in regards to results from the surgery. We will have patient medically optimized by their primary care physician and plan on proceeding with surgery at next available date.     He denies history of DVT or pulmonary embolus, denies cardiac history, he is not diabetic.    We will plan for surgery on outpatient basis, we will plan for use of Farhana robot        Cortez Delgado was in agreement with plan and had all questions answered.     Orders:  Orders Placed This Encounter   Procedures    XR Knee 3 View Bilateral       Medications:  No orders of the defined types were placed in this encounter.      Followup:  No follow-ups on file.    Diagnoses and all orders for this visit:    1. Pain in both knees, unspecified chronicity (Primary)  -     XR Knee 3 View Bilateral    2. Primary osteoarthritis of right shoulder    3. Primary osteoarthritis of both knees                  Dictated utilizing Dragon dictation     Patient or patient representative verbalized consent for the use of Ambient Listening during the visit with  Thanh White MD for chart documentation. 9/26/2024  09:23 EDT

## 2024-10-08 ENCOUNTER — TELEPHONE (OUTPATIENT)
Dept: ORTHOPEDIC SURGERY | Facility: CLINIC | Age: 79
End: 2024-10-08
Payer: MEDICARE

## 2024-10-08 RX ORDER — PREGABALIN 150 MG/1
150 CAPSULE ORAL ONCE
OUTPATIENT
Start: 2024-10-08 | End: 2024-10-08

## 2024-10-08 RX ORDER — TRIAMCINOLONE ACETONIDE 40 MG/ML
80 INJECTION, SUSPENSION INTRA-ARTICULAR; INTRAMUSCULAR
Status: COMPLETED | OUTPATIENT
Start: 2024-09-26 | End: 2024-09-26

## 2024-10-08 RX ORDER — ACETAMINOPHEN 325 MG/1
1000 TABLET ORAL ONCE
OUTPATIENT
Start: 2024-10-08 | End: 2024-10-08

## 2024-10-08 RX ORDER — LIDOCAINE HYDROCHLORIDE 10 MG/ML
4 INJECTION, SOLUTION EPIDURAL; INFILTRATION; INTRACAUDAL; PERINEURAL
Status: COMPLETED | OUTPATIENT
Start: 2024-09-26 | End: 2024-09-26

## 2024-10-08 RX ORDER — MELOXICAM 7.5 MG/1
15 TABLET ORAL ONCE
OUTPATIENT
Start: 2024-10-08 | End: 2024-10-08

## 2024-10-08 NOTE — TELEPHONE ENCOUNTER
Patient called to check on status of scheduling surgery for R knee. Needing orders please.   Admission

## 2024-10-11 PROBLEM — M17.11 PRIMARY OSTEOARTHRITIS OF RIGHT KNEE: Status: ACTIVE | Noted: 2024-09-26

## 2024-10-22 ENCOUNTER — PRE-ADMISSION TESTING (OUTPATIENT)
Dept: PREADMISSION TESTING | Facility: HOSPITAL | Age: 79
End: 2024-10-22
Payer: MEDICARE

## 2024-10-22 ENCOUNTER — HOSPITAL ENCOUNTER (OUTPATIENT)
Dept: PHYSICAL THERAPY | Facility: HOSPITAL | Age: 79
Setting detail: THERAPIES SERIES
Discharge: HOME | End: 2024-10-22
Payer: MEDICARE

## 2024-10-22 VITALS
OXYGEN SATURATION: 96 % | SYSTOLIC BLOOD PRESSURE: 142 MMHG | WEIGHT: 200 LBS | BODY MASS INDEX: 28 KG/M2 | HEIGHT: 71 IN | RESPIRATION RATE: 16 BRPM | DIASTOLIC BLOOD PRESSURE: 84 MMHG | HEART RATE: 63 BPM

## 2024-10-22 DIAGNOSIS — M17.11 PRIMARY OSTEOARTHRITIS OF RIGHT KNEE: ICD-10-CM

## 2024-10-22 LAB
ABO GROUP BLD: NORMAL
ABO GROUP BLD: NORMAL
BILIRUB UR QL STRIP: NEGATIVE
BLD GP AB SCN SERPL QL: NEGATIVE
CLARITY UR: CLEAR
COLOR UR: YELLOW
GLUCOSE UR STRIP-MCNC: NEGATIVE MG/DL
HBA1C MFR BLD: 5.64 % (ref 4.8–5.6)
HGB UR QL STRIP.AUTO: NEGATIVE
KETONES UR QL STRIP: NEGATIVE
LEUKOCYTE ESTERASE UR QL STRIP.AUTO: NEGATIVE
NITRITE UR QL STRIP: NEGATIVE
PH UR STRIP.AUTO: 7 [PH] (ref 4.5–8)
PROT UR QL STRIP: NEGATIVE
RH BLD: NEGATIVE
RH BLD: NEGATIVE
SP GR UR STRIP: 1.02 (ref 1–1.03)
T&S EXPIRATION DATE: NORMAL
UROBILINOGEN UR QL STRIP: NORMAL

## 2024-10-22 PROCEDURE — 86901 BLOOD TYPING SEROLOGIC RH(D): CPT

## 2024-10-22 PROCEDURE — 86850 RBC ANTIBODY SCREEN: CPT | Performed by: ORTHOPAEDIC SURGERY

## 2024-10-22 PROCEDURE — 86900 BLOOD TYPING SEROLOGIC ABO: CPT | Performed by: ORTHOPAEDIC SURGERY

## 2024-10-22 PROCEDURE — 83036 HEMOGLOBIN GLYCOSYLATED A1C: CPT | Performed by: ORTHOPAEDIC SURGERY

## 2024-10-22 PROCEDURE — 86901 BLOOD TYPING SEROLOGIC RH(D): CPT | Performed by: ORTHOPAEDIC SURGERY

## 2024-10-22 PROCEDURE — 80048 BASIC METABOLIC PNL TOTAL CA: CPT | Performed by: ORTHOPAEDIC SURGERY

## 2024-10-22 PROCEDURE — 85025 COMPLETE CBC W/AUTO DIFF WBC: CPT | Performed by: ORTHOPAEDIC SURGERY

## 2024-10-22 PROCEDURE — 85610 PROTHROMBIN TIME: CPT | Performed by: ORTHOPAEDIC SURGERY

## 2024-10-22 PROCEDURE — 85730 THROMBOPLASTIN TIME PARTIAL: CPT | Performed by: ORTHOPAEDIC SURGERY

## 2024-10-22 PROCEDURE — 93005 ELECTROCARDIOGRAM TRACING: CPT

## 2024-10-22 PROCEDURE — 81003 URINALYSIS AUTO W/O SCOPE: CPT | Performed by: ORTHOPAEDIC SURGERY

## 2024-10-22 PROCEDURE — 86900 BLOOD TYPING SEROLOGIC ABO: CPT

## 2024-10-22 PROCEDURE — 87081 CULTURE SCREEN ONLY: CPT | Performed by: ORTHOPAEDIC SURGERY

## 2024-10-22 NOTE — DISCHARGE INSTRUCTIONS
PRE-ADMISSION TESTING INSTRUCTIONS FOR TOTAL JOINT PATIENTS    Take these medications the morning of surgery with a small sip of water: metoprolol      No aspirin, advil, aleve, ibuprofen, naproxen, diet pills, decongestants, or vitamin/herbal supplements for a week prior to your surgery.     Tylenol/Acetaminophen ok to take if needed.    Do not take any insulin or diabetes medications the morning of surgery.    Your surgeon may give you Bactroban to use prior to surgery. This will be started 5 days prior to surgery, follow the directions on your prescription from your surgeon for use.      General Instructions:    DO NOT EAT SOLID FOOD AFTER MIDNIGHT THE NIGHT BEFORE SURGERY. No gum, mints, or hard candy after midnight the night before surgery.  You may drink clear liquids the day of surgery up until 2 hours before your arrival time.  Clear liquids are liquids you can see through. Nothing RED in color.    Plain water    Sports drinks      Gelatin (Jell-O)  Fruit juices without pulp such as white grape juice and apple juice  Popsicles that contain no fruit or yogurt  Tea or coffee (no cream or milk added)    It is beneficial for you to have a clear drink that contains carbohydrates 2 hours prior to your arrival time.  DRINK A BOTTLE OF SPORTS DRINK 2 HOURS BEFORE YOUR ARRIVAL TIME. IF YOU ARE DIABETIC, DRINK A LOW OR NO SUGAR SPORTS DRINK. ANY COLOR EXCEPT RED.    Patients who avoid smoking, chewing tobacco and alcohol for 4 weeks prior to surgery have a reduced risk of post-operative complications.  If at all possible, quit smoking as many days before surgery as you can.    Do not smoke, use chewing tobacco or drink alcohol after midnight the day of surgery.    Bring your C-PAP/ BI-PAP machine if you use one.  Wear clean comfortable clothes.  Do not wear contact lenses, lotion, deodorant, or make-up.  Bring a case for your glasses if applicable.   You may brush your teeth the morning of surgery.  You may wear  dentures/partials, do not put adhesive/glue on them.  Leave all other jewelry and valuables at home.  NOTIFY YOUR SURGEON IF YOU BECOME ILL, HAVE A FEVER, PRODUCTIVE COUGH, OR CANNOT BE HERE THE DAY OF SURGERY.      Preventing a Surgical Site Infection:    Shower the night before and on the morning of surgery using the chlorhexidine soap you were given.  Use a clean washcloth with the soap.  Place clean sheets on your bed after showering the night before surgery. Do not use the CHG soap on your hair, face, or private areas. Wash your body gently for five (5) minutes. Do not scrub your skin.  Dry with a clean towel and dress in clean clothing.    Do not shave the surgical area for 10 days-2 weeks prior to surgery  because the razor can irritate skin and make it easier to develop an infection.  Make sure you, your family, and all healthcare providers clean their hands with soap and water or an alcohol based hand  before caring for you or your wound.      Day of surgery:    Your surgeon’s office will advise you of your arrival time for the day of surgery.    Upon arrival, a Pre-op nurse and Anesthesia provider will review your health history, obtain vital signs, and answer questions you may have. The anesthesia provider will also discuss the type of anesthesia that will be needed for your procedure, which may include general anesthesia. The only belongings needed at this time will be your home medications and if applicable your C-PAP/BI-PAP machine.  If you are staying overnight your family can leave the rest of your belongings in the car and bring them to your room later.  A Pre-op nurse will start an IV and you may receive medication in preparation for surgery, including something to help you relax.  Your family will be able to see you in the Pre-op area.  While you are in surgery your family should notify the waiting room  if they leave the waiting room area and provide a contact phone  number.    If you have any questions, you can call the Pre-Admission Department at [phone] or your surgeon's office.    Please be aware that surgery does come with discomfort.  We want to make every effort to control your discomfort so please discuss any uncontrolled symptoms with your nurse.   Your doctor will most likely have prescribed pain medications.     You may have bruising or discomfort from the tourniquet used in surgery.     Please leave all luggage in the car the morning of surgery.  You will be transported to your hospital room following the recovery period.  Your family can get your luggage at that time.      You may receive a survey regarding the care you received. Your feedback is very important and will be used to collect the necessary data to help us to continue to provide excellent care.

## 2024-10-22 NOTE — PAT
Pt here for PAT visit.  Pre-op tests completed, chg soap given, and instructions reviewed.  Instructed clears until 2 hrs prior to arrival time, voiced understanding. ARROW pain pump reviewed

## 2024-10-23 ENCOUNTER — TELEPHONE (OUTPATIENT)
Dept: ORTHOPEDIC SURGERY | Facility: CLINIC | Age: 79
End: 2024-10-23
Payer: MEDICARE

## 2024-10-23 LAB — MRSA SPEC QL CULT: NORMAL

## 2024-10-24 LAB
QT INTERVAL: 423 MS
QTC INTERVAL: 384 MS

## 2024-10-29 NOTE — DISCHARGE PLACEMENT REQUEST
"Yajaira Blanco (79 y.o. Male)       Date of Birth   1945    Social Security Number       Address   [address] [place] [address]    Home Phone   [phone]    MRN   [id number]       Nondenominational   Roman [employer]    Marital Status                               Admission Date       Admission Type   Elective    Admitting Provider   Thanh White MD    Attending Provider   Thanh White MD    Department, Room/Bed   [facility], --/--       Discharge Date       Discharge Disposition       Discharge Destination                                 Attending Provider: Thanh White MD    Allergies: Penicillins    Isolation: None   Infection: None   Code Status: Not on file    Ht: 180.3 cm (71\")   Wt: 90.7 kg (200 lb)    Admission Cmt: None   Principal Problem: Primary osteoarthritis of right knee [M17.11]                   Active Insurance as of 11/5/2024       Primary Coverage       Payor Plan Insurance Group Employer/Plan Group    MEDICARE MEDICARE A & B        Payor Plan Address Payor Plan Phone Number Payor Plan Fax Number Effective Dates    [address] [phone]  10/1/2009 - None Entered    [facility] [id number]         Subscriber Name Subscriber Birth Date Member ID       YAJAIRA BLANCO 1945 [id number]               Secondary Coverage       Payor Plan Insurance Group Employer/Plan Group    [employer] SUP AAR HEALTH CARE OPTIONS        Payor Plan Address Payor Plan Phone Number Payor Plan Fax Number Effective Dates    [facility] [phone]  1/1/2021 - None Entered    [address]       [facility] [id number]         Subscriber Name Subscriber Birth Date Member ID       YAJAIRA BLANCO 1945 [id number]                     Emergency Contacts        (Rel.) Home Phone Work Phone Mobile Phone    MARTIN BLANCO (Spouse) -- -- [phone]                "

## 2024-10-29 NOTE — CASE MANAGEMENT/SOCIAL WORK
Continued Stay Note  EMY Tsang     Patient Name: Cortez Delgado  MRN: [id number]  Today's Date: 10/29/2024    Admit Date: (Not on file)        Discharge Plan       Row Name 10/29/24 3674       Plan    Plan Comments Patient states he will need a rolling walker and BSC at discharge. He would like to use HH for the first couple of weeks for in home PT and after reviewing agencies with patient his preference is [facility]. CM spoke with Rissa with [facility] and referral given. She states they should be able to accept and will review and accept in [facility]. CM will follow.                   Discharge Codes    No documentation.                       Jazmine Roach RN

## 2024-10-29 NOTE — DISCHARGE PLACEMENT REQUEST
"Yajaira Blanco (79 y.o. Male)       Date of Birth   1945    Social Security Number       Address   [address] [place] [address]    Home Phone   [phone]    MRN   [id number]       [place]   [employer]    Marital Status                               Admission Date       Admission Type   Elective    Admitting Provider   Thanh White MD    Attending Provider   Thanh White MD    Department, Room/Bed   [facility], --/--       Discharge Date       Discharge Disposition       Discharge Destination                                 Attending Provider: Thanh White MD    Allergies: Penicillins    Isolation: None   Infection: None   Code Status: Not on file    Ht: 180.3 cm (71\")   Wt: 90.7 kg (200 lb)    Admission Cmt: None   Principal Problem: Primary osteoarthritis of right knee [M17.11]                   Active Insurance as of 11/5/2024       Primary Coverage       Payor Plan Insurance Group Employer/Plan Group    MEDICARE MEDICARE A & B        Payor Plan Address Payor Plan Phone Number Payor Plan Fax Number Effective Dates    [address] [phone]  10/1/2009 - None Entered    [facility] [id number]         Subscriber Name Subscriber Birth Date Member ID       YAJAIRA BLANCO 1945 [id number]               Secondary Coverage       Payor Plan Insurance Group Employer/Plan Group    [employer] SUP AAR HEALTH CARE OPTIONS        Payor Plan Address Payor Plan Phone Number Payor Plan Fax Number Effective Dates    [facility] [phone]  1/1/2021 - None Entered    [address]       [facility] [address]         Subscriber Name Subscriber Birth Date Member ID       YAJAIRA BLANCO 1945 [id number]                     Emergency Contacts        (Rel.) Home Phone Work Phone Mobile Phone    MARTIN BLANCO (Spouse) -- -- [phone]                "

## 2024-11-04 ENCOUNTER — ANESTHESIA EVENT (OUTPATIENT)
Dept: PERIOP | Facility: HOSPITAL | Age: 79
End: 2024-11-04
Payer: MEDICARE

## 2024-11-04 ENCOUNTER — OFFICE VISIT (OUTPATIENT)
Dept: ORTHOPEDIC SURGERY | Facility: CLINIC | Age: 79
End: 2024-11-04
Payer: MEDICARE

## 2024-11-04 DIAGNOSIS — M17.11 PRIMARY OSTEOARTHRITIS OF RIGHT KNEE: Primary | ICD-10-CM

## 2024-11-04 PROCEDURE — 99024 POSTOP FOLLOW-UP VISIT: CPT | Performed by: ORTHOPAEDIC SURGERY

## 2024-11-04 NOTE — DISCHARGE INSTRUCTIONS
Total Knee Replacement Discharge Instructions:    I. ACTIVITIES:  1. Exercises:  Complete exercise program as taught by the hospital physical therapist 2 times per day  Exercise program will be advanced by the physical therapist  During the day be up ambulating every 2 hours (while awake) for short distances  Complete the ankle pump exercises at least 10 times per hour (while awake)  Elevate legs most of the day the first week post operatively and thereafter elevate legs when in bed and for at least 30 minutes during the day. Caution must be taken to avoid pillow placement under the bend of the knee as this can lead to flexion contractures of the knee.  Use cold packs 20-30 minutes approximately 5 times per day. This should be done before and after completing your exercises and at any time you are experiencing pain/ stiffness in your operative extremity.  Apply 6 inch ace wraps to operative extremity from ankle to groin. Please keep in place at all times except when bathing.      2. Activities of Daily Living:  No tub baths, hot tubs, or swimming pools for 4 weeks  May shower on post-operative day #3- Do not scrub or rub around the incision or mesh. No soap or alcohol to incision, just let water run over wound.         II. RESTRICTIONS  Do not cross legs or kneel  Your surgeon will discuss with you when you will be able to drive again.  Weight bearing as tolerated  First week stay inside on even terrain. May go up and down stairs one stair at a time utilizing the hand rail  After one week, you may venture outside.     III. PRECAUTIONS:  Everyone that comes near you should wash their hands  No elective dental, genital-urinary, or colon procedures or surgical procedures for 12 weeks after surgery unless absolutely necessary.   If dental work or surgical procedure is deemed absolutely necessary, you will need to contact your surgeon as you will need to take antibiotics 1 hour prior to any dental work (including teeth  cleanings).  Please discuss with your surgeon prophylactic antibiotics as the length of time this intervention will be necessary for you varies with each patient’s health history and situation.  Avoid sick people. If you must be around someone who is ill, they should wear a mask.  Avoid visits to the Emergency Room or Urgent Care unless you are having a life              threatening event.     IV. INCISION CARE:  Wash your hands prior to dressing changes  Incision and knee should be covered with an ACE wrap daily for compression. No creams or ointments to the incision  Do not touch or pick at the incision  Check incision every day and notify surgeon immediately if any of the following signs or symptoms are noted:  Increase in redness  Increase in swelling around the incision and of the entire extremity  Increase in pain  Drainage oozing from the incision  Pulling apart of the edges of the incision  Increase in overall body temperature (greater than 100.5 degrees)  You will be given further instructions on removal of the glue and mesh over your incision at your first follow up visit at the office.     V. MEDICATIONS:   1. Anticoagulants: You will be discharged on an anticoagulant, typically either Aspirin or Eliquis. This is a prophylactic medication that helps prevent blood clots during your post-operative period. The type and length of dosage varies based on your individual needs, procedure performed, and surgeon’s preference.  While taking the anticoagulant, you should avoid taking any additional aspirin, ibuprofen (Advil or Motrin), Aleve (Naprosyn) or other non-steroidal anti-inflammatory medications.   Notify surgeon immediately if any doc bleeding is noted in the urine, stool, emesis, or from the nose or the incision. Blood in the stool will often appear as black rather than red. Blood in urine may appear as pink. Blood in emesis may appear as brown/black like coffee grounds.  You will need to apply pressure  for longer periods of time to any cuts or abrasions to stop bleeding  Avoid alcohol while taking anticoagulants    2. Stool Softeners: You will be at greater risk of constipation after surgery due to being less mobile and the pain medications.   Take stool softeners as instructed by your surgeon while on pain medications. Over the counter Colace 100 mg 1-2 capsules twice daily.   If stools become too loose or too frequent, please decreases the dosage or stop the stool softener.  If constipation occurs despite use of stool softeners, you are to continue the stool softeners and add a laxative (Milk of Magnesia 1 ounce daily as needed)  Drink plenty of fluids, and eat fruits and vegetables during your recovery time    3. Pain Medications utilized after surgery are narcotics and the law requires that the following information be given to all patients that are prescribed narcotics:  CLASSIFICATION: Pain medications are called Opioids and are narcotics  LEGALITIES: It is illegal to share narcotics with others and to drive within 24 hours of taking narcotics  POTENTIAL SIDE EFFECTS: Potential side effects of opioids include: nausea, vomiting, itching, dizziness, drowsiness, dry mouth, constipation, and difficulty urinating.  POTENTIAL ADVERSE EFFECTS:   Opioid tolerance can develop with use of pain medications and this simply means that it requires more and more of the medication to control pain; however, this is seen more in patients that use opioids for longer periods of time.  Opioid dependence can develop with use of Opioids and this simply means that to stop the medication can cause withdrawal symptoms; however, this is seen with patients that use Opioids for longer periods of time.  Opioid addiction can develop with use of Opioids and the incidence of this is very unlikely in patients who take the medications as ordered and stop the medications as instructed.  Opioid overdose can be dangerous, but is unlikely when  the medication is taken as ordered and stopped when ordered. It is important not to mix opioids with alcohol or with and type of sedative such as Benadryl as this can lead to over sedation and respiratory difficulty.  DOSAGE:   Pain medications will need to be taken consistently for the first week to decrease pain and promote adequate pain relief and participation in physical therapy.  After the initial surgical pain begins to resolve, you may begin to decrease the pain medication. By the end of 6 weeks, you should be off of pain medications.  Refills will not be given by the office during evening hours, on weekends, or after 12 weeks post-op.  To seek refills on pain medications during the initial 6 week post-operative period, you must call the office 48 hours in advance to request the refill. The office will then notify you when to  the prescription. DO NOT wait until you are out of the medication to request a refill.    VI. FOLLOW-UP VISITS:  You will need to follow up in the office with Judith Jolley Nurse Practitioner in 2 weeks. Please call  [phone]  to schedule this appointment.  You will need to follow up with your primary care physician within 4 weeks.  If you have any concerns or suspected complications prior to your follow up visit, please call your surgeons office. Do not wait until your appointment time if you suspect complications. These will need to be addressed in the office promptly.

## 2024-11-04 NOTE — H&P (VIEW-ONLY)
History & Physical       Patient: Cortez Delgado    YOB: 1945    Medical Record Number: [id number]    Surgeon:  Dr. Thanh White    Chief Complaints:   Chief Complaint   Patient presents with    Right Knee - Follow-up       Subjective:  This problem is not new to this examiner.     History of Present Illness: 79 y.o. male presents with   Chief Complaint   Patient presents with    Right Knee - Follow-up   . Onset of symptoms was years ago and has been progressively worsening despite more conservative treatment measures.  Symptoms are associated with ability to move, exercise, and perform activities of daily living.  Symptoms are aggravated by weight bearing and ROM necessary for activities of daily living.   Symptoms improve with rest, ice and elevation only minimally.      Allergies:   Allergies   Allergen Reactions    Penicillins Other (See Comments) and Rash     Please ask patient reaction       Medications:   Home Medications:  Current Outpatient Medications on File Prior to Visit   Medication Sig    aspirin 81 MG EC tablet Take 1 tablet by mouth Daily.    metoprolol succinate XL (TOPROL-XL) 50 MG 24 hr tablet Take 1 tablet by mouth Daily.    NIACIN CR PO Take 500 mg by mouth Daily.    Pediatric Multivitamins-Iron (FLINTSTONES W/IRON PO) Take  by mouth.    rosuvastatin (CRESTOR) 5 MG tablet Take 1 tablet by mouth Daily.    valsartan (DIOVAN) 80 MG tablet Take 1 tablet by mouth Daily.    vitamin B-12 (CYANOCOBALAMIN) 1000 MCG tablet Take 1 tablet by mouth Daily.    vitamin D3 125 MCG (5000 UT) capsule capsule Take 1 capsule by mouth Daily.     No current facility-administered medications on file prior to visit.     Current Medications:  Scheduled Meds:  Continuous Infusions:No current facility-administered medications for this visit.    PRN Meds:.    I have reviewed the patient's medical history in detail and updated the computerized patient record.  Review and summarization of old records  include:    Past Medical History:   Diagnosis Date    Arthritis     Colon polyps     Fracture of wrist     Approx 12 yrs ago    Frozen shoulder     Hyperlipidemia     Hypertension     Knee swelling     Periarthritis of shoulder     Sleep apnea     cpap    Tendinitis of knee         Past Surgical History:   Procedure Laterality Date    COLONOSCOPY      COLONOSCOPY N/A 10/30/2023    Procedure: COLONOSCOPY TO CECUM;  Surgeon: David Ryder MD;  Location: [facility] [place] MAIN OR;  Service: Gastroenterology;  Laterality: N/A;  diverticulosis, polyps,hemorrhoids    COLONOSCOPY W/ POLYPECTOMY N/A 04/20/2023    Procedure: COLONOSCOPY WITH POLYPECTOMY;  Surgeon: David Ryder MD;  Location: [facility] [place] MAIN OR;  Service: Gastroenterology;  Laterality: N/A;  DIVERTICULOSIS, POLYPS X4, HEMORRHOIDS    FRACTURE SURGERY      WRIST SURGERY Left     Approx 12 yrs ago        Social History     Occupational History    Not on file   Tobacco Use    Smoking status: Never     Passive exposure: Never    Smokeless tobacco: Never   Vaping Use    Vaping status: Never Used   Substance and Sexual Activity    Alcohol use: Never    Drug use: Never    Sexual activity: Defer      Social History     Social History Narrative    Not on file      History reviewed. No pertinent family history.    ROS: 14 point review of systems was performed and was negative except for documented findings in HPI and today's encounter.     Allergies:   Allergies   Allergen Reactions    Penicillins Other (See Comments) and Rash     Please ask patient reaction     Constitutional:  Denies fever, shaking or chills   Eyes:  Denies change in visual acuity   HENT:  Denies nasal congestion or sore throat   Respiratory:  Denies cough or shortness of breath   Cardiovascular:  Denies chest pain or severe LE edema   GI:  Denies abdominal pain, nausea, vomiting, bloody stools or diarrhea   Musculoskeletal:  Denies numbness tingling or loss of motor function except as outlined above in  history of present illness.  : Denies painful urination or hematuria  Integument:  Denies rash, lesion or ulceration   Neurologic:  Denies headache or focal weakness  Endocrine:  Denies lymphadenopathy  Psych:  Denies confusion or change in mental status   Hem:  Denies active bleeding    Physical Exam: 79 y.o. male  There is no height or weight on file to calculate BMI.  There were no vitals filed for this visit.  Vital signs reviewed.   General Appearance:    Alert, cooperative, in no acute distress                  Eyes: conjunctivae clear  ENT: external ears and nose atraumatic  CV: no peripheral edema  Resp: normal respiratory effort  Skin: no rashes or wounds; normal turgor  Psych: mood and affect appropriate  Lymph: no nodes appreciated  Neuro: gross sensation intact  Vascular:  Palpable peripheral pulse in noted extremity  Musculoskeletal Extremities:   Right knee-range of motion 5 to 125 degrees, 4+ out of 5 strength on flexion extension, significant varus alignment, maximal tenderness palpation medial joint line as well as medial lateral patellar facet with positive active patella compression test, positive Long exam with pain, no click. Moderate effusion. Grade 1A Lachman, negative anterior posterior drawer. No hip pain on logroll Stinchfield exam, negative straight leg raise bilateral lower extremities.     Debilities/Disabilities Identified: None      Diagnostic Tests:  Pre-Admission Testing on 10/22/2024   Component Date Value Ref Range Status    QT Interval 10/22/2024 423  ms Final    QTC Interval 10/22/2024 384  ms Final    Color, UA 10/22/2024 Yellow  Yellow, Straw Final    Appearance, UA 10/22/2024 Clear  Clear Final    pH, UA 10/22/2024 7.0  4.5 - 8.0 Final    Specific Gravity, UA 10/22/2024 1.020  1.003 - 1.030 Final    Glucose, UA 10/22/2024 Negative  Negative Final    Ketones, UA 10/22/2024 Negative  Negative Final    Bilirubin, UA 10/22/2024 Negative  Negative Final    Blood, UA 10/22/2024  Negative  Negative Final    Protein, UA 10/22/2024 Negative  Negative Final    Leuk Esterase, UA 10/22/2024 Negative  Negative Final    Nitrite, UA 10/22/2024 Negative  Negative Final    Urobilinogen, UA 10/22/2024 0.2 E.U./dL  0.2 - 1.0 E.U./dL Final    MRSA Screen Cx 10/22/2024 No Methicillin Resistant Staphylococcus aureus isolated   Final    ABO Type 10/22/2024 O   Final    RH type 10/22/2024 Negative   Final    Antibody Screen 10/22/2024 Negative   Final    T&S Expiration Date 10/22/2024 11/5/2024 11:59:00 PM   Final    Hemoglobin A1C 10/22/2024 5.64 (H)  4.80 - 5.60 % Final    ABO Type 10/22/2024 O   Final    RH type 10/22/2024 Negative   Final     No results found.    Assessment:  Right knee pain, DJD     Plan:  I reviewed anatomy of a total joint arthroplasty in laymen's terms, as well as typical postoperative recovery and possibly 6-12 months for maximal recovery, and possible need for rehabilitation stay after hospitalization. We also discussed risks, benefits, alternatives, and limitations of procedure with risks including but not limited to neurovascular damage, bleeding, infection, malalignment, chronic pain, failure of implants, osteolysis, loosening of implants, loss of motion, weakness, stiffness, instability, DVT, pulmonary embolus, death, stroke, complex regional pain syndrome, myocardial infarction, and need for additional procedures. No guarantees were given regarding results of surgery.      Cortez ELLIS Sandy was given the opportunity to ask and have all questions answered today.  The patient voiced understanding of the risks, benefits, and alternative forms of treatment that were discussed and the patient consents to proceed with surgery.     Patient's blood clot history is negative.    Plan for DVT prophylaxis is aspirin    Patient's MRSA infection history is negative.    Patient's skin infection history is negative.    Discharge Plan: POD 0-1 to home    Date: 11/4/2024    Dictated utilizing  Jennifer dictation

## 2024-11-04 NOTE — H&P
History & Physical       Patient: Cortez Delgado    YOB: 1945    Medical Record Number: [id number]    Surgeon:  Dr. Thanh White    Chief Complaints:   Chief Complaint   Patient presents with    Right Knee - Follow-up       Subjective:  This problem is not new to this examiner.     History of Present Illness: 79 y.o. male presents with   Chief Complaint   Patient presents with    Right Knee - Follow-up   . Onset of symptoms was years ago and has been progressively worsening despite more conservative treatment measures.  Symptoms are associated with ability to move, exercise, and perform activities of daily living.  Symptoms are aggravated by weight bearing and ROM necessary for activities of daily living.   Symptoms improve with rest, ice and elevation only minimally.      Allergies:   Allergies   Allergen Reactions    Penicillins Other (See Comments) and Rash     Please ask patient reaction       Medications:   Home Medications:  Current Outpatient Medications on File Prior to Visit   Medication Sig    aspirin 81 MG EC tablet Take 1 tablet by mouth Daily.    metoprolol succinate XL (TOPROL-XL) 50 MG 24 hr tablet Take 1 tablet by mouth Daily.    NIACIN CR PO Take 500 mg by mouth Daily.    Pediatric Multivitamins-Iron (FLINTSTONES W/IRON PO) Take  by mouth.    rosuvastatin (CRESTOR) 5 MG tablet Take 1 tablet by mouth Daily.    valsartan (DIOVAN) 80 MG tablet Take 1 tablet by mouth Daily.    vitamin B-12 (CYANOCOBALAMIN) 1000 MCG tablet Take 1 tablet by mouth Daily.    vitamin D3 125 MCG (5000 UT) capsule capsule Take 1 capsule by mouth Daily.     No current facility-administered medications on file prior to visit.     Current Medications:  Scheduled Meds:  Continuous Infusions:No current facility-administered medications for this visit.    PRN Meds:.    I have reviewed the patient's medical history in detail and updated the computerized patient record.  Review and summarization of old records  include:    Past Medical History:   Diagnosis Date    Arthritis     Colon polyps     Fracture of wrist     Approx 12 yrs ago    Frozen shoulder     Hyperlipidemia     Hypertension     Knee swelling     Periarthritis of shoulder     Sleep apnea     cpap    Tendinitis of knee         Past Surgical History:   Procedure Laterality Date    COLONOSCOPY      COLONOSCOPY N/A 10/30/2023    Procedure: COLONOSCOPY TO CECUM;  Surgeon: David Ryder MD;  Location: [facility] [place] MAIN OR;  Service: Gastroenterology;  Laterality: N/A;  diverticulosis, polyps,hemorrhoids    COLONOSCOPY W/ POLYPECTOMY N/A 04/20/2023    Procedure: COLONOSCOPY WITH POLYPECTOMY;  Surgeon: David Ryder MD;  Location: [facility] [place] MAIN OR;  Service: Gastroenterology;  Laterality: N/A;  DIVERTICULOSIS, POLYPS X4, HEMORRHOIDS    FRACTURE SURGERY      WRIST SURGERY Left     Approx 12 yrs ago        Social History     Occupational History    Not on file   Tobacco Use    Smoking status: Never     Passive exposure: Never    Smokeless tobacco: Never   Vaping Use    Vaping status: Never Used   Substance and Sexual Activity    Alcohol use: Never    Drug use: Never    Sexual activity: Defer      Social History     Social History Narrative    Not on file      History reviewed. No pertinent family history.    ROS: 14 point review of systems was performed and was negative except for documented findings in HPI and today's encounter.     Allergies:   Allergies   Allergen Reactions    Penicillins Other (See Comments) and Rash     Please ask patient reaction     Constitutional:  Denies fever, shaking or chills   Eyes:  Denies change in visual acuity   HENT:  Denies nasal congestion or sore throat   Respiratory:  Denies cough or shortness of breath   Cardiovascular:  Denies chest pain or severe LE edema   GI:  Denies abdominal pain, nausea, vomiting, bloody stools or diarrhea   Musculoskeletal:  Denies numbness tingling or loss of motor function except as outlined above in  history of present illness.  : Denies painful urination or hematuria  Integument:  Denies rash, lesion or ulceration   Neurologic:  Denies headache or focal weakness  Endocrine:  Denies lymphadenopathy  Psych:  Denies confusion or change in mental status   Hem:  Denies active bleeding    Physical Exam: 79 y.o. male  There is no height or weight on file to calculate BMI.  There were no vitals filed for this visit.  Vital signs reviewed.   General Appearance:    Alert, cooperative, in no acute distress                  Eyes: conjunctivae clear  ENT: external ears and nose atraumatic  CV: no peripheral edema  Resp: normal respiratory effort  Skin: no rashes or wounds; normal turgor  Psych: mood and affect appropriate  Lymph: no nodes appreciated  Neuro: gross sensation intact  Vascular:  Palpable peripheral pulse in noted extremity  Musculoskeletal Extremities:   Right knee-range of motion 5 to 125 degrees, 4+ out of 5 strength on flexion extension, significant varus alignment, maximal tenderness palpation medial joint line as well as medial lateral patellar facet with positive active patella compression test, positive Long exam with pain, no click. Moderate effusion. Grade 1A Lachman, negative anterior posterior drawer. No hip pain on logroll Stinchfield exam, negative straight leg raise bilateral lower extremities.     Debilities/Disabilities Identified: None      Diagnostic Tests:  Pre-Admission Testing on 10/22/2024   Component Date Value Ref Range Status    QT Interval 10/22/2024 423  ms Final    QTC Interval 10/22/2024 384  ms Final    Color, UA 10/22/2024 Yellow  Yellow, Straw Final    Appearance, UA 10/22/2024 Clear  Clear Final    pH, UA 10/22/2024 7.0  4.5 - 8.0 Final    Specific Gravity, UA 10/22/2024 1.020  1.003 - 1.030 Final    Glucose, UA 10/22/2024 Negative  Negative Final    Ketones, UA 10/22/2024 Negative  Negative Final    Bilirubin, UA 10/22/2024 Negative  Negative Final    Blood, UA 10/22/2024  Negative  Negative Final    Protein, UA 10/22/2024 Negative  Negative Final    Leuk Esterase, UA 10/22/2024 Negative  Negative Final    Nitrite, UA 10/22/2024 Negative  Negative Final    Urobilinogen, UA 10/22/2024 0.2 E.U./dL  0.2 - 1.0 E.U./dL Final    MRSA Screen Cx 10/22/2024 No Methicillin Resistant Staphylococcus aureus isolated   Final    ABO Type 10/22/2024 O   Final    RH type 10/22/2024 Negative   Final    Antibody Screen 10/22/2024 Negative   Final    T&S Expiration Date 10/22/2024 11/5/2024 11:59:00 PM   Final    Hemoglobin A1C 10/22/2024 5.64 (H)  4.80 - 5.60 % Final    ABO Type 10/22/2024 O   Final    RH type 10/22/2024 Negative   Final     No results found.    Assessment:  Right knee pain, DJD     Plan:  I reviewed anatomy of a total joint arthroplasty in laymen's terms, as well as typical postoperative recovery and possibly 6-12 months for maximal recovery, and possible need for rehabilitation stay after hospitalization. We also discussed risks, benefits, alternatives, and limitations of procedure with risks including but not limited to neurovascular damage, bleeding, infection, malalignment, chronic pain, failure of implants, osteolysis, loosening of implants, loss of motion, weakness, stiffness, instability, DVT, pulmonary embolus, death, stroke, complex regional pain syndrome, myocardial infarction, and need for additional procedures. No guarantees were given regarding results of surgery.      Cortez ELLIS Sandy was given the opportunity to ask and have all questions answered today.  The patient voiced understanding of the risks, benefits, and alternative forms of treatment that were discussed and the patient consents to proceed with surgery.     Patient's blood clot history is negative.    Plan for DVT prophylaxis is aspirin    Patient's MRSA infection history is negative.    Patient's skin infection history is negative.    Discharge Plan: POD 0-1 to home    Date: 11/4/2024    Dictated utilizing  Jennifer dictation

## 2024-11-05 ENCOUNTER — APPOINTMENT (OUTPATIENT)
Dept: ULTRASOUND IMAGING | Facility: HOSPITAL | Age: 79
End: 2024-11-05
Payer: MEDICARE

## 2024-11-05 ENCOUNTER — APPOINTMENT (OUTPATIENT)
Dept: GENERAL RADIOLOGY | Facility: HOSPITAL | Age: 79
End: 2024-11-05
Payer: MEDICARE

## 2024-11-05 ENCOUNTER — HOSPITAL ENCOUNTER (OUTPATIENT)
Facility: HOSPITAL | Age: 79
Discharge: HOME | End: 2024-11-06
Attending: ORTHOPAEDIC SURGERY | Admitting: ORTHOPAEDIC SURGERY
Payer: MEDICARE

## 2024-11-05 ENCOUNTER — ANESTHESIA (OUTPATIENT)
Dept: PERIOP | Facility: HOSPITAL | Age: 79
End: 2024-11-05
Payer: MEDICARE

## 2024-11-05 DIAGNOSIS — M17.11 PRIMARY OSTEOARTHRITIS OF RIGHT KNEE: ICD-10-CM

## 2024-11-05 DIAGNOSIS — Z96.651 S/P TOTAL KNEE ARTHROPLASTY, RIGHT: Primary | ICD-10-CM

## 2024-11-05 PROCEDURE — 25010000002 VANCOMYCIN 1 G RECONSTITUTED SOLUTION: Performed by: ORTHOPAEDIC SURGERY

## 2024-11-05 PROCEDURE — A9270 NON-COVERED ITEM OR SERVICE: HCPCS | Performed by: ORTHOPAEDIC SURGERY

## 2024-11-05 PROCEDURE — 25810000003 SODIUM CHLORIDE 0.9 % SOLUTION: Performed by: NURSE ANESTHETIST, CERTIFIED REGISTERED

## 2024-11-05 PROCEDURE — 25810000003 LACTATED RINGERS PER 1000 ML: Performed by: NURSE ANESTHETIST, CERTIFIED REGISTERED

## 2024-11-05 PROCEDURE — 25010000002 VANCOMYCIN HCL 1.25 G RECONSTITUTED SOLUTION 1 EACH VIAL: Performed by: ORTHOPAEDIC SURGERY

## 2024-11-05 PROCEDURE — 25010000002 PROPOFOL 1000 MG/100ML EMULSION

## 2024-11-05 PROCEDURE — 73560 X-RAY EXAM OF KNEE 1 OR 2: CPT

## 2024-11-05 PROCEDURE — C1776 JOINT DEVICE (IMPLANTABLE): HCPCS | Performed by: ORTHOPAEDIC SURGERY

## 2024-11-05 PROCEDURE — 63710000001 ACETAMINOPHEN 325 MG TABLET: Performed by: ORTHOPAEDIC SURGERY

## 2024-11-05 PROCEDURE — 94799 UNLISTED PULMONARY SVC/PX: CPT

## 2024-11-05 PROCEDURE — 25810000003 SODIUM CHLORIDE 0.9 % SOLUTION 250 ML FLEX CONT: Performed by: ORTHOPAEDIC SURGERY

## 2024-11-05 PROCEDURE — C1713 ANCHOR/SCREW BN/BN,TIS/BN: HCPCS | Performed by: ORTHOPAEDIC SURGERY

## 2024-11-05 PROCEDURE — 25010000002 BUPIVACAINE 0.5 % SOLUTION

## 2024-11-05 PROCEDURE — 25010000002 BUPIVACAINE (PF) 0.5 % SOLUTION: Performed by: NURSE ANESTHETIST, CERTIFIED REGISTERED

## 2024-11-05 PROCEDURE — G0378 HOSPITAL OBSERVATION PER HR: HCPCS

## 2024-11-05 PROCEDURE — 25010000002 ONDANSETRON PER 1 MG: Performed by: NURSE ANESTHETIST, CERTIFIED REGISTERED

## 2024-11-05 PROCEDURE — L1830 KO IMMOB CANVAS LONG PRE OTS: HCPCS | Performed by: ORTHOPAEDIC SURGERY

## 2024-11-05 PROCEDURE — 20985 CPTR-ASST DIR MS PX: CPT | Performed by: ORTHOPAEDIC SURGERY

## 2024-11-05 PROCEDURE — 94761 N-INVAS EAR/PLS OXIMETRY MLT: CPT

## 2024-11-05 PROCEDURE — 25010000002 DEXAMETHASONE PER 1 MG: Performed by: NURSE ANESTHETIST, CERTIFIED REGISTERED

## 2024-11-05 PROCEDURE — 25010000002 MIDAZOLAM PER 1MG: Performed by: NURSE ANESTHETIST, CERTIFIED REGISTERED

## 2024-11-05 PROCEDURE — 25010000002 BUPIVACAINE (PF) 0.25 % SOLUTION

## 2024-11-05 PROCEDURE — 25010000002 LIDOCAINE 2% SOLUTION

## 2024-11-05 PROCEDURE — 63710000001 MELOXICAM 7.5 MG TABLET: Performed by: ORTHOPAEDIC SURGERY

## 2024-11-05 PROCEDURE — 27447 TOTAL KNEE ARTHROPLASTY: CPT | Performed by: ORTHOPAEDIC SURGERY

## 2024-11-05 DEVICE — IMPLANTABLE DEVICE
Type: IMPLANTABLE DEVICE | Site: KNEE | Status: FUNCTIONAL
Brand: PERSONA®

## 2024-11-05 DEVICE — KNOTLESS TISSUE CONTROL DEVICE, UNDYED UNIDIRECTIONAL (ANTIBACTERIAL) SYNTHETIC ABSORBABLE DEVICE
Type: IMPLANTABLE DEVICE | Site: KNEE | Status: FUNCTIONAL
Brand: STRATAFIX

## 2024-11-05 DEVICE — VIOLET ANTIBACTERIAL POLYDIOXANONE, KNOTLESS TISSUE CONTROL DEVICE
Type: IMPLANTABLE DEVICE | Site: KNEE | Status: FUNCTIONAL
Brand: STRATAFIX

## 2024-11-05 DEVICE — CAP EXT STEM KN UPCHRG: Type: IMPLANTABLE DEVICE | Site: KNEE | Status: FUNCTIONAL

## 2024-11-05 DEVICE — CAP TOTL KN CMT PRIMARY W/ROSA: Type: IMPLANTABLE DEVICE | Site: KNEE | Status: FUNCTIONAL

## 2024-11-05 DEVICE — IMPLANTABLE DEVICE
Type: IMPLANTABLE DEVICE | Site: KNEE | Status: FUNCTIONAL
Brand: PERSONA® NATURAL TIBIA®

## 2024-11-05 DEVICE — IMPLANTABLE DEVICE
Type: IMPLANTABLE DEVICE | Site: KNEE | Status: FUNCTIONAL
Brand: PERSONA® VIVACIT-E®

## 2024-11-05 DEVICE — IMPLANTABLE DEVICE
Type: IMPLANTABLE DEVICE | Site: KNEE | Status: FUNCTIONAL
Brand: REFOBACIN® BONE CEMENT R

## 2024-11-05 RX ORDER — BUPIVACAINE HYDROCHLORIDE 2.5 MG/ML
INJECTION, SOLUTION EPIDURAL; INFILTRATION; INTRACAUDAL
Status: COMPLETED | OUTPATIENT
Start: 2024-11-05 | End: 2024-11-05

## 2024-11-05 RX ORDER — ONDANSETRON 2 MG/ML
4 INJECTION INTRAMUSCULAR; INTRAVENOUS ONCE AS NEEDED
Status: COMPLETED | OUTPATIENT
Start: 2024-11-05 | End: 2024-11-05

## 2024-11-05 RX ORDER — TRANEXAMIC ACID 10 MG/ML
1000 INJECTION, SOLUTION INTRAVENOUS ONCE
Status: DISCONTINUED | OUTPATIENT
Start: 2024-11-05 | End: 2024-11-05 | Stop reason: HOSPADM

## 2024-11-05 RX ORDER — DEXAMETHASONE SODIUM PHOSPHATE 10 MG/ML
8 INJECTION INTRAMUSCULAR; INTRAVENOUS ONCE AS NEEDED
Status: COMPLETED | OUTPATIENT
Start: 2024-11-05 | End: 2024-11-05

## 2024-11-05 RX ORDER — ACETAMINOPHEN 325 MG/1
950 TABLET ORAL 3 TIMES DAILY
Status: DISCONTINUED | OUTPATIENT
Start: 2024-11-05 | End: 2024-11-06 | Stop reason: HOSPADM

## 2024-11-05 RX ORDER — SODIUM CHLORIDE, SODIUM LACTATE, POTASSIUM CHLORIDE, CALCIUM CHLORIDE 600; 310; 30; 20 MG/100ML; MG/100ML; MG/100ML; MG/100ML
100 INJECTION, SOLUTION INTRAVENOUS ONCE
Status: DISCONTINUED | OUTPATIENT
Start: 2024-11-05 | End: 2024-11-05 | Stop reason: HOSPADM

## 2024-11-05 RX ORDER — LIDOCAINE HYDROCHLORIDE 10 MG/ML
0.5 INJECTION, SOLUTION INFILTRATION; PERINEURAL ONCE AS NEEDED
Status: DISCONTINUED | OUTPATIENT
Start: 2024-11-05 | End: 2024-11-05 | Stop reason: HOSPADM

## 2024-11-05 RX ORDER — NALOXONE HCL 0.4 MG/ML
0.4 VIAL (ML) INJECTION
Status: DISCONTINUED | OUTPATIENT
Start: 2024-11-05 | End: 2024-11-06 | Stop reason: HOSPADM

## 2024-11-05 RX ORDER — OXYCODONE HYDROCHLORIDE 5 MG/1
5 TABLET ORAL EVERY 4 HOURS PRN
Status: DISCONTINUED | OUTPATIENT
Start: 2024-11-05 | End: 2024-11-06 | Stop reason: HOSPADM

## 2024-11-05 RX ORDER — DOCUSATE SODIUM 100 MG/1
100 CAPSULE, LIQUID FILLED ORAL 2 TIMES DAILY PRN
Qty: 30 CAPSULE | Refills: 0 | Status: SHIPPED | OUTPATIENT
Start: 2024-11-05

## 2024-11-05 RX ORDER — SENNOSIDES A AND B 8.6 MG/1
1 TABLET, FILM COATED ORAL NIGHTLY
Qty: 30 TABLET | Refills: 0 | Status: SHIPPED | OUTPATIENT
Start: 2024-11-05

## 2024-11-05 RX ORDER — FAMOTIDINE 10 MG/ML
20 INJECTION, SOLUTION INTRAVENOUS
Status: COMPLETED | OUTPATIENT
Start: 2024-11-05 | End: 2024-11-05

## 2024-11-05 RX ORDER — MIDAZOLAM HYDROCHLORIDE 2 MG/2ML
0.5 INJECTION, SOLUTION INTRAMUSCULAR; INTRAVENOUS
Status: DISCONTINUED | OUTPATIENT
Start: 2024-11-05 | End: 2024-11-05 | Stop reason: HOSPADM

## 2024-11-05 RX ORDER — DEXMEDETOMIDINE HYDROCHLORIDE 100 UG/ML
INJECTION, SOLUTION INTRAVENOUS
Status: COMPLETED | OUTPATIENT
Start: 2024-11-05 | End: 2024-11-05

## 2024-11-05 RX ORDER — ACETAMINOPHEN 500 MG
1000 TABLET ORAL ONCE
Status: COMPLETED | OUTPATIENT
Start: 2024-11-05 | End: 2024-11-05

## 2024-11-05 RX ORDER — PREGABALIN 75 MG/1
150 CAPSULE ORAL ONCE
Status: COMPLETED | OUTPATIENT
Start: 2024-11-05 | End: 2024-11-05

## 2024-11-05 RX ORDER — MELOXICAM 7.5 MG/1
15 TABLET ORAL DAILY
Status: DISCONTINUED | OUTPATIENT
Start: 2024-11-05 | End: 2024-11-06 | Stop reason: HOSPADM

## 2024-11-05 RX ORDER — VANCOMYCIN HYDROCHLORIDE 1 G/20ML
INJECTION, POWDER, LYOPHILIZED, FOR SOLUTION INTRAVENOUS AS NEEDED
Status: DISCONTINUED | OUTPATIENT
Start: 2024-11-05 | End: 2024-11-05 | Stop reason: HOSPADM

## 2024-11-05 RX ORDER — ONDANSETRON 2 MG/ML
4 INJECTION INTRAMUSCULAR; INTRAVENOUS ONCE AS NEEDED
Status: DISCONTINUED | OUTPATIENT
Start: 2024-11-05 | End: 2024-11-05 | Stop reason: HOSPADM

## 2024-11-05 RX ORDER — TRANEXAMIC ACID 10 MG/ML
1000 INJECTION, SOLUTION INTRAVENOUS ONCE
Status: COMPLETED | OUTPATIENT
Start: 2024-11-05 | End: 2024-11-05

## 2024-11-05 RX ORDER — ASPIRIN 81 MG/1
81 TABLET ORAL EVERY 12 HOURS SCHEDULED
Status: DISCONTINUED | OUTPATIENT
Start: 2024-11-06 | End: 2024-11-06 | Stop reason: HOSPADM

## 2024-11-05 RX ORDER — METOPROLOL SUCCINATE 50 MG/1
50 TABLET, EXTENDED RELEASE ORAL DAILY
Status: DISCONTINUED | OUTPATIENT
Start: 2024-11-06 | End: 2024-11-06 | Stop reason: HOSPADM

## 2024-11-05 RX ORDER — LIDOCAINE HYDROCHLORIDE 20 MG/ML
INJECTION, SOLUTION INFILTRATION; PERINEURAL AS NEEDED
Status: DISCONTINUED | OUTPATIENT
Start: 2024-11-05 | End: 2024-11-05 | Stop reason: SURG

## 2024-11-05 RX ORDER — ONDANSETRON 4 MG/1
4 TABLET, ORALLY DISINTEGRATING ORAL EVERY 8 HOURS PRN
Qty: 30 TABLET | Refills: 0 | Status: SHIPPED | OUTPATIENT
Start: 2024-11-05

## 2024-11-05 RX ORDER — MELOXICAM 7.5 MG/1
15 TABLET ORAL ONCE
Status: COMPLETED | OUTPATIENT
Start: 2024-11-05 | End: 2024-11-05

## 2024-11-05 RX ORDER — ONDANSETRON 4 MG/1
4 TABLET, ORALLY DISINTEGRATING ORAL EVERY 6 HOURS PRN
Status: DISCONTINUED | OUTPATIENT
Start: 2024-11-05 | End: 2024-11-06 | Stop reason: HOSPADM

## 2024-11-05 RX ORDER — SODIUM CHLORIDE, SODIUM LACTATE, POTASSIUM CHLORIDE, CALCIUM CHLORIDE 600; 310; 30; 20 MG/100ML; MG/100ML; MG/100ML; MG/100ML
9 INJECTION, SOLUTION INTRAVENOUS CONTINUOUS
Status: DISCONTINUED | OUTPATIENT
Start: 2024-11-05 | End: 2024-11-06 | Stop reason: HOSPADM

## 2024-11-05 RX ORDER — OXYCODONE HYDROCHLORIDE 5 MG/1
10 TABLET ORAL EVERY 4 HOURS PRN
Status: DISCONTINUED | OUTPATIENT
Start: 2024-11-05 | End: 2024-11-06 | Stop reason: HOSPADM

## 2024-11-05 RX ORDER — BUPIVACAINE HYDROCHLORIDE 5 MG/ML
INJECTION, SOLUTION PERINEURAL
Status: COMPLETED | OUTPATIENT
Start: 2024-11-05 | End: 2024-11-05

## 2024-11-05 RX ORDER — FAMOTIDINE 40 MG/1
40 TABLET, FILM COATED ORAL DAILY
Qty: 30 TABLET | Refills: 0 | Status: SHIPPED | OUTPATIENT
Start: 2024-11-05

## 2024-11-05 RX ORDER — PROPOFOL 10 MG/ML
INJECTION, EMULSION INTRAVENOUS CONTINUOUS PRN
Status: DISCONTINUED | OUTPATIENT
Start: 2024-11-05 | End: 2024-11-05 | Stop reason: SURG

## 2024-11-05 RX ORDER — ONDANSETRON 2 MG/ML
4 INJECTION INTRAMUSCULAR; INTRAVENOUS EVERY 6 HOURS PRN
Status: DISCONTINUED | OUTPATIENT
Start: 2024-11-05 | End: 2024-11-06 | Stop reason: HOSPADM

## 2024-11-05 RX ORDER — OXYCODONE AND ACETAMINOPHEN 5; 325 MG/1; MG/1
1 TABLET ORAL ONCE AS NEEDED
Status: DISCONTINUED | OUTPATIENT
Start: 2024-11-05 | End: 2024-11-05 | Stop reason: HOSPADM

## 2024-11-05 RX ORDER — DOXYCYCLINE 100 MG/1
100 CAPSULE ORAL 2 TIMES DAILY
Qty: 20 CAPSULE | Refills: 0 | Status: SHIPPED | OUTPATIENT
Start: 2024-11-05 | End: 2024-11-15

## 2024-11-05 RX ORDER — OXYCODONE AND ACETAMINOPHEN 5; 325 MG/1; MG/1
1-2 TABLET ORAL EVERY 4 HOURS PRN
Qty: 42 TABLET | Refills: 0 | Status: SHIPPED | OUTPATIENT
Start: 2024-11-05

## 2024-11-05 RX ORDER — ROSUVASTATIN CALCIUM 5 MG/1
5 TABLET, COATED ORAL DAILY
Status: DISCONTINUED | OUTPATIENT
Start: 2024-11-06 | End: 2024-11-06 | Stop reason: HOSPADM

## 2024-11-05 RX ORDER — ASPIRIN 81 MG/1
81 TABLET ORAL 2 TIMES DAILY
Qty: 60 TABLET | Refills: 0 | Status: SHIPPED | OUTPATIENT
Start: 2024-11-05

## 2024-11-05 RX ADMIN — ACETAMINOPHEN 1000 MG: 500 TABLET ORAL at 12:03

## 2024-11-05 RX ADMIN — VANCOMYCIN HYDROCHLORIDE 1250 MG: 1.25 INJECTION, POWDER, LYOPHILIZED, FOR SOLUTION INTRAVENOUS at 23:55

## 2024-11-05 RX ADMIN — ONDANSETRON 4 MG: 2 INJECTION INTRAMUSCULAR; INTRAVENOUS at 12:08

## 2024-11-05 RX ADMIN — PREGABALIN 150 MG: 75 CAPSULE ORAL at 12:04

## 2024-11-05 RX ADMIN — TRANEXAMIC ACID 1000 MG: 10 INJECTION, SOLUTION INTRAVENOUS at 14:38

## 2024-11-05 RX ADMIN — MIDAZOLAM HYDROCHLORIDE 0.5 MG: 1 INJECTION, SOLUTION INTRAMUSCULAR; INTRAVENOUS at 13:42

## 2024-11-05 RX ADMIN — SODIUM CHLORIDE, POTASSIUM CHLORIDE, SODIUM LACTATE AND CALCIUM CHLORIDE 9 ML/HR: 600; 310; 30; 20 INJECTION, SOLUTION INTRAVENOUS at 12:04

## 2024-11-05 RX ADMIN — DEXMEDETOMIDINE HYDROCHLORIDE 10 MCG: 100 INJECTION, SOLUTION INTRAVENOUS at 13:54

## 2024-11-05 RX ADMIN — BUPIVACAINE HYDROCHLORIDE 2.2 ML: 5 INJECTION, SOLUTION PERINEURAL at 14:07

## 2024-11-05 RX ADMIN — TRANEXAMIC ACID 1000 MG: 10 INJECTION, SOLUTION INTRAVENOUS at 16:44

## 2024-11-05 RX ADMIN — PROPOFOL INJECTABLE EMULSION 50 MCG/KG/MIN: 10 INJECTION, EMULSION INTRAVENOUS at 14:33

## 2024-11-05 RX ADMIN — ACETAMINOPHEN 975 MG: 325 TABLET ORAL at 20:09

## 2024-11-05 RX ADMIN — LIDOCAINE HYDROCHLORIDE 45 MG: 20 INJECTION, SOLUTION INFILTRATION; PERINEURAL at 14:33

## 2024-11-05 RX ADMIN — BUPIVACAINE HYDROCHLORIDE 12 ML: 2.5 INJECTION, SOLUTION EPIDURAL; INFILTRATION; INTRACAUDAL; PERINEURAL at 13:51

## 2024-11-05 RX ADMIN — BUPIVACAINE HYDROCHLORIDE 20 ML: 2.5 INJECTION, SOLUTION EPIDURAL; INFILTRATION; INTRACAUDAL at 13:58

## 2024-11-05 RX ADMIN — BUPIVACAINE HYDROCHLORIDE 20 ML: 2.5 INJECTION, SOLUTION EPIDURAL; INFILTRATION; INTRACAUDAL at 13:54

## 2024-11-05 RX ADMIN — DEXMEDETOMIDINE 30 MCG: 100 INJECTION, SOLUTION, CONCENTRATE INTRAVENOUS at 13:51

## 2024-11-05 RX ADMIN — DEXAMETHASONE SODIUM PHOSPHATE 8 MG: 10 INJECTION INTRAMUSCULAR; INTRAVENOUS at 12:03

## 2024-11-05 RX ADMIN — VANCOMYCIN HYDROCHLORIDE 1250 MG: 1.25 INJECTION, POWDER, LYOPHILIZED, FOR SOLUTION INTRAVENOUS at 12:55

## 2024-11-05 RX ADMIN — MELOXICAM 15 MG: 7.5 TABLET ORAL at 12:04

## 2024-11-05 RX ADMIN — FAMOTIDINE 20 MG: 10 INJECTION, SOLUTION INTRAVENOUS at 12:03

## 2024-11-05 RX ADMIN — MELOXICAM 15 MG: 7.5 TABLET ORAL at 20:09

## 2024-11-05 RX ADMIN — BUPIVACAINE HYDROCHLORIDE 8 ML/HR: 5 INJECTION, SOLUTION EPIDURAL; INTRACAUDAL; PERINEURAL at 18:53

## 2024-11-05 NOTE — INTERVAL H&P NOTE
H&P reviewed. The patient was examined and there are no changes to the H&P.    Vitals:    11/05/24 1146   BP: 148/79   Pulse: 51   Resp: 18   Temp: 97.9 °F (36.6 °C)   TempSrc: Oral   SpO2: 98%   Weight: 88.1 kg (194 lb 3.2 oz)

## 2024-11-05 NOTE — ANESTHESIA PROCEDURE NOTES
Peripheral Block    Pre-sedation assessment completed: 11/5/2024 1:40 PM    Patient reassessed immediately prior to procedure    Patient location during procedure: pre-op  Start time: 11/5/2024 1:47 PM  Stop time: 11/5/2024 1:51 PM  Reason for block: at surgeon's request and post-op pain management  Performed by  CRNA/CAA: Vera Jack, YAMELSRNA: Jose De Jesus Pierre SRNA  Preanesthetic Checklist  Completed: patient identified, IV checked, site marked, risks and benefits discussed, surgical consent, monitors and equipment checked, pre-op evaluation and timeout performed  Prep:  Pt Position: supine  Sterile barriers:cap, gloves, mask and washed/disinfected hands  Prep: ChloraPrep  Patient monitoring: blood pressure monitoring, continuous pulse oximetry and EKG  Procedure    Sedation: yes  Performed under: local infiltration  Guidance:ultrasound guided    ULTRASOUND INTERPRETATION.  Using ultrasound guidance a 21 G gauge needle was placed in close proximity to the nerve, at which point, under ultrasound guidance anesthetic was injected in the area of the nerve and spread of the anesthesia was seen on ultrasound in close proximity thereto.  There were no abnormalities seen on ultrasound; a digital image was taken; and the patient tolerated the procedure with no complications. Images:still images obtained, printed/placed on chart    Laterality:right  Block Type:adductor canal block  Injection Technique:catheter  Needle Type:echogenic  Needle Gauge:21 G  Resistance on Injection: none  Catheter Size:20 G    Medications Used: dexmedetomidine HCl (PRECEDEX) injection - Perineural   30 mcg - 11/5/2024 1:51:00 PM  bupivacaine PF (MARCAINE) injection 0.25% - Perineural   12 mL - 11/5/2024 1:51:00 PM      Post Assessment  Injection Assessment: negative aspiration for heme, no paresthesia on injection and incremental injection  Patient Tolerance:comfortable throughout block  Complications:no  Performed by: Jose De Jesus Pierre,  SRNA

## 2024-11-05 NOTE — ANESTHESIA PREPROCEDURE EVALUATION
Anesthesia Evaluation     Patient summary reviewed and Nursing notes reviewed   no history of anesthetic complications:   NPO Solid Status: > 8 hours  NPO Liquid Status: > 4 hours           Airway   Mallampati: II  TM distance: >3 FB  Neck ROM: full  No difficulty expected  Dental      Pulmonary - normal exam   (+) ,sleep apnea on CPAP  (-) not a smoker  Cardiovascular   Exercise tolerance: good (4-7 METS)    Rhythm: regular  Rate: normal    (+) hypertension well controlled 2 medications or greater, hyperlipidemia    PE comment: PAC and occ PVC  HR 50's    Neuro/Psych  GI/Hepatic/Renal/Endo - negative ROS     Musculoskeletal     Abdominal  - normal exam   Substance History - negative use     OB/GYN          Other   arthritis (knees a nd shoulders),     ROS/Med Hx Other: Gatorade and water 0930              Anesthesia Plan    ASA 2     MAC, spinal and regional     (Procedures and risks of spinal, ACB, ipack, and MAC discussed with patient and accepted.  Discussed cannot guarantee pain free recovery.)  intravenous induction     Anesthetic plan, risks, benefits, and alternatives have been provided, discussed and informed consent has been obtained with: patient and spouse/significant other.  Pre-procedure education provided  Use of blood products discussed with patient and spouse/significant other  Consented to blood products.    Plan discussed with CRNA.    CODE STATUS:

## 2024-11-05 NOTE — ANESTHESIA PROCEDURE NOTES
Peripheral Block    Pre-sedation assessment completed: 11/5/2024 1:40 PM    Patient reassessed immediately prior to procedure    Patient location during procedure: pre-op  Start time: 11/5/2024 1:54 PM  Stop time: 11/5/2024 1:58 PM  Reason for block: at surgeon's request and post-op pain management  Performed by  CRNA/CAA: Vera Jack, YAMELSRNA: Jose De Jesus Pierre SRNA  Preanesthetic Checklist  Completed: patient identified, IV checked, site marked, risks and benefits discussed, surgical consent, monitors and equipment checked, pre-op evaluation and timeout performed  Prep:  Pt Position: supine  Sterile barriers:cap, gloves, mask and washed/disinfected hands  Prep: ChloraPrep  Patient monitoring: blood pressure monitoring, continuous pulse oximetry and EKG  Procedure    Sedation: yes  Performed under: local infiltration  Guidance:ultrasound guided    ULTRASOUND INTERPRETATION.  Using ultrasound guidance a 21 G gauge needle was placed in close proximity to the nerve, at which point, under ultrasound guidance anesthetic was injected in the area of the nerve and spread of the anesthesia was seen on ultrasound in close proximity thereto.  There were no abnormalities seen on ultrasound; a digital image was taken; and the patient tolerated the procedure with no complications. Images:still images obtained, printed/placed on chart    Laterality:right  Block Type:CASSANDRA  Injection Technique:single-shot  Needle Type:echogenic  Needle Gauge:21 G  Resistance on Injection: none    Medications Used: bupivacaine PF (MARCAINE) injection 0.25% - Injection   20 mL - 11/5/2024 1:58:00 PM      Post Assessment  Injection Assessment: negative aspiration for heme, no paresthesia on injection and incremental injection  Patient Tolerance:comfortable throughout block  Complications:no  Performed by: Jose De Jesus Pierre, ERA

## 2024-11-05 NOTE — ANESTHESIA PROCEDURE NOTES
Peripheral Block    Pre-sedation assessment completed: 11/5/2024 1:40 PM    Patient reassessed immediately prior to procedure    Patient location during procedure: pre-op  Start time: 11/5/2024 1:52 PM  Stop time: 11/5/2024 1:54 PM  Reason for block: at surgeon's request and post-op pain management  Performed by  CRNA/CAA: Vera Jack, YAMELSRNA: Jose De Jesus Pierre SRNA  Preanesthetic Checklist  Completed: patient identified, IV checked, site marked, risks and benefits discussed, surgical consent, monitors and equipment checked, pre-op evaluation and timeout performed  Prep:  Pt Position: supine  Sterile barriers:cap, gloves, mask and washed/disinfected hands  Prep: ChloraPrep  Patient monitoring: blood pressure monitoring, continuous pulse oximetry and EKG  Procedure    Sedation: yes  Performed under: local infiltration  Guidance:ultrasound guided    ULTRASOUND INTERPRETATION.  Using ultrasound guidance a 21 G gauge needle was placed in close proximity to the nerve, at which point, under ultrasound guidance anesthetic was injected in the area of the nerve and spread of the anesthesia was seen on ultrasound in close proximity thereto.  There were no abnormalities seen on ultrasound; a digital image was taken; and the patient tolerated the procedure with no complications. Images:still images obtained, printed/placed on chart    Laterality:right  Block Type:iPack  Injection Technique:single-shot  Needle Type:echogenic  Needle Gauge:21 G  Resistance on Injection: none    Medications Used: dexmedetomidine HCl (PRECEDEX) injection - Perineural   10 mcg - 11/5/2024 1:54:00 PM  bupivacaine PF (MARCAINE) injection 0.25% - Perineural   20 mL - 11/5/2024 1:54:00 PM      Post Assessment  Injection Assessment: negative aspiration for heme, no paresthesia on injection and incremental injection  Patient Tolerance:comfortable throughout block  Complications:no  Performed by: Jose De Jesus Pierre, SRNA

## 2024-11-05 NOTE — ANESTHESIA PROCEDURE NOTES
Spinal Block    Pre-sedation assessment completed: 11/5/2024 1:40 PM    Patient reassessed immediately prior to procedure    Patient location during procedure: pre-op  Start Time: 11/5/2024 2:05 PM  Stop Time: 11/5/2024 2:07 PM  Indication:at surgeon's request and post-op pain management  Performed By  CRNA/CAA: Vera Jack, YAMELSRNA: Jose De Jesus Pierre SRNA  Preanesthetic Checklist  Completed: patient identified, IV checked, site marked, risks and benefits discussed, surgical consent, monitors and equipment checked, pre-op evaluation and timeout performed  Spinal Block Prep:  Patient Position:sitting  Sterile Tech:cap, gloves, mask and sterile barriers  Prep:Chloraprep  Patient Monitoring:blood pressure monitoring, continuous pulse oximetry and EKG    Spinal Block Procedure  Approach:midline  Guidance:landmark technique and palpation technique  Location:L3-L4  Needle Type:Sprotte  Needle Gauge:25 G  Placement of Spinal needle event:cerebrospinal fluid aspirated  Paresthesia: no  Fluid Appearance:clear  Medications: bupivacaine (MARCAINE) injection 0.5% - Injection   2.2 mL - 11/5/2024 2:07:00 PM   Post Assessment  Patient Tolerance:patient tolerated the procedure well with no apparent complications  Complications no

## 2024-11-05 NOTE — OP NOTE
Date of Surgery: 11/5/2024    Preoperative diagnosis: right knee osteoarthritis    Postoperative diagnosis: right knee osteoarthritis    Procedure:  1.right total knee arthroplasty  2. Intraoperative use of robotic navigation    Surgical Approach: Knee Medial Parapatellar         Surgeon: Christopher Mccarthy.:  Assistant: Owen Rubio CSA was responsible for performing the following activities: Retraction, Irrigation, Closing, and Placing Dressing and their skilled assistance was necessary for the success of this case.    Anesthesia: MAC, spinal, regional    Estimated blood loss: <500ml    Fluids: per anesthesia    Specimens: None    Complications: None    Drains: None    Tourniquet time: Tourniquet Times:     Inflated: 11/5/2024  3:25 PM     Deflated: 11/5/2024  4:44 PM    Findings: right knee end-stage osteoarthritis     Implants used: [employer] Person[employer] total knee arthroplasty system with a size 38 patella, size H tibia, size 10 cruciate retaining femoral component , 13 mm highly cross-linked medial congruent polyethylene insert, antibiotic cement    Examination under anesthesia: right knee passive range of motion 6-120°, varus alignment, no open wounds lacerations or abrasions over knee, stable to varus and valgus stress at 6 and 30°, 1+ dorsalis pedis pulse.    Indications for procedure: Patient is a pleasant 79 y.o. male who has had significant pain to right knee over the last several years, has failed conservative treatment with intra-articular injections, bracing, home exercise program, activity modification, anti-inflammatory medications. Has had persistent pain limiting activities of daily living and even has had pain at rest. We discussed treatment options and patient wished to proceed with above-mentioned surgery. Was explained details of procedure as well as risks benefits and alternatives as documented on history and physical and had all questions answered. No guarantees were given in regards to results of  the surgery.    Details of procedure: Patient was seen, evaluated, and cleared for surgery by anesthesia. Had been medically optimized by primary care physician. Patient was met in the preoperative hold area, operative site was marked, consent was reviewed, history and physical was updated, and preoperative labs were reviewed.  Regional block and spinal were placed per anesthesia and patient was taken to the operating room and placed in supine position on a regular OR table. Examination under anesthesia was carried out this time. Nonsterile tourniquet was then applied to right lower extremity. Patient was secured to the table with a waist strap and all bony prominences were well-padded. right lower extremity was then sterilely prepped and draped in standard fashion.    Formal timeout was completed including confirmation of history and physical, operative consent, operative site, patient identification number, and preoperative antibiotics administration. right leg was then exsanguinated and tourniquet inflated to 250 mmHg. Procedure was then begun with longitudinal incision over the anterior aspect of the right knee through skin and subcutaneous tissue with 15 blade. Minimal subcutaneous flaps were developed at this point in time, and standard medial parapatellar arthrotomy was then created at this point. Portion of suprapatellar and infrapatellar fat pad were resected this point in time.  Minimal medial tibial peel was carried out in order to allow for appropriate exposure of the knee. Medial and lateral meniscus were resected this time and ACL was excised with limited release of the PCL.    Patella was then everted and measured with a caliper. Patellar reamer was then used to create initial retropatellar cut followed by completion of cut with a oscillating saw in standard fashion and use of rongeur. Patella was sized as a 38 and patella guard was placed at this time.    Attention was then turned to placement of  navigational tracking devices for use of the [employer] robotic system.2 pins were placed in the anterior medial metaphyseal region of the distal femur and tracker was secured into position over these pins.  A 3 cm incision through skin only was made over the anteromedial face of the tibia with soft tissue dissection down to the cortical bone.  2 pins were placed in likewise fashion in the anterior medial tibia and the tibial tracker was secured to these pins with stable positioning noted at this time.  Referencing sequence for the robotic device was then completed as well as assessment of extension and flexion gaps and soft tissue balance as well as overall alignment of the knee at this time.     Intraoperative plan for the surgical procedure was then completed with adjustments made to accomplish symmetric gaps medial and lateral on both flexion and extension to the greatest extent possible while allowing for additional minimal soft tissue release.  Once we achieved an intraoperative plan for a balanced knee, we proceeded to bone-cutting portion of the procedure.    Attention was then turned to the distal femur with the knee being brought into a flexed position.  Robotic arm was brought into the operative field, delivered to the anterior cortex of the femur and pinned into position at this point time. Depth of the resection was checked with a sickle and noted to be appropriate. Oscillating saw was then used to create a distal femoral cut in standard fashion while collateral ligaments were protected with Z retractors. Bone was removed at this time.  Cut validation was completed with navigational device at this point time as well confirming appropriate cut consistent with set parameters as noted in intraoperative plan. Pins were removed and the robotic arm and cut block were then adjusted to the distal femoral cut surface at this point in time.  Once robotic arm was positioned appropriate according to plan, 2 pins were  inserted into the distal femur to allow for later placement of the 4-in-1 cut guide for the final femoral cut.     Attention was then turned to the proximal tibia.  Robotic arm was then brought into the operative field over the proximal tibia with reference position being obtained according to intraoperative plan and stability of the knee being held carefully at this point time.  2 pins were placed and locked the cut block into position.  Once position was obtained and appropriate, robotic arm was removed and attention was then turned to additional soft tissue resection particularly around the posterior lateral and posterior medial aspects of the tibia.  Posterior tibial retractor was then placed at this point time which allowed for anterior translation of the tibia. Tibial cutting block resection depth was checked with a sickle. Oscillating saw was then used to complete the proximal tibial cut while collateral ligaments were protected with Z retractors. Bone fragments were removed and measured. Knee was brought into full extension with extension gap being checked with spacer block at this time and noted be acceptable with knee brought into full extension, stable medial and lateral collateral stability at full extension.      Attention was then returned to the distal femur with size 10 femoral cutting block impacted onto the distal femoral cut surface and pinned into position. Sickle was used to check the anterior cut and there was no evidence of anticipated notching. Collateral ligaments were protected with Z retractors while anterior, posterior, and chamfer cuts were created in standard fashion with oscillating saw. Femoral cutting block was removed at this time as well as bone fragments. Posterior capsule was stripped at this time. Size 10 femoral trial was placed. Size H tibial baseplate trial with 13 mm polyethylene trial was inserted. Knee was then ranged from 0-135°, good varus and valgus stability at full  extension and 30° as well as throughout mid flexion with good AP translation at 90° of flexion noted.    Patella was everted at this point in time, 38 mm patella reaming guide was placed and lug holes were drilled for patella at this point. Patella trial was placed and patella was noted to track in midline with no evidence of subluxation. Knee was ranged from full extension to full flexion and tibial rotation was noted with midline of tibial trial being marked with Bovie electrocautery at the proximal tibia. Femoral and patellar and tibial trials were removed at this point in time and tibia was subluxated anteriorly with posterior retractor. Tibial trial baseplate was placed once again, position confirmed with drop nhi as well as with previous marking for tibial rotation and assessing for bony coverage of implant. Once position of tibial baseplate was noted to be appropriate, 2 pins were placed at this time, and reamer and punch were then used through the tibial baseplate.    All trial components were once again removed at this time and pulsatile lavage was used to thoroughly clean all bony cut surfaces as well as subcutaneous tissue. Final implants were opened on the back table this time. Cement was prepared on the back table and was applied to the cut surfaces of the distal femur, proximal tibia, and patella as well as to the backside of the implants. Tibial component was placed first followed by distal femur followed by patella. Extraneous cement was removed with freer at this time. Once all implants were in position knee was brought into full extension with axial compression to allow for cement to cure fully.    Once cement was completely hardened, trial polyethylene insert was assessed, range of motion of knee from 0-130° was achieved with good varus and valgus stability throughout range of motion and good AP translation at 90° of flexion. Thus a 13 mm polyethylene insert was opened on the back table, inserted  and locked in appropriately into the tibial baseplate. Knee was thoroughly irrigated with a second evaluation for any additional bone fragments or cement particles. Knee was then thoroughly irrigated with Betadine solution followed by pulsatile lavage. 1 g of vancomycin powder was added to deep and subcutaneous tissue at this time.    Attention was then turned to removal of navigational trackers and pins from distal femur and proximal tibia at this point time.  The separate small proximal tibia incision was irrigated as well with Betadine solution followed by saline.    Attention was then turned to closure of the wound with running self locking #2 suture ×1, 2-0 Vicryls in inverted fashion for deep subcutaneous closure, 3-0 running self locking strata-fix suture, and glue and mesh for skin. Wound was dressed with Telfa, 4 x 4 gauze, web roll, ABD pad, Ace wrap, and patient was placed in knee immobilizer and given ice pack. At the end of procedure all lap, needle and sponge counts were correct ×2. Patient had brisk cap refill all digits right lower extremity, compartments are soft and easily compressible at the end of the procedure.    Disposition: Patient was taken to recovery room in stable condition. Will be discharged pending appropriate pain control and initiation of therapy, weight-bear as tolerated right lower extremity, DVT prophylaxis will be started on postoperative day #1 with aspirin. Results of the procedure were discussed immediately postoperatively with patient's family and they had all questions answered at that time.

## 2024-11-06 ENCOUNTER — HOME HEALTH ADMISSION (OUTPATIENT)
Dept: HOME HEALTH SERVICES | Facility: HOME HEALTHCARE | Age: 79
End: 2024-11-06
Payer: MEDICARE

## 2024-11-06 VITALS
OXYGEN SATURATION: 96 % | BODY MASS INDEX: 27.19 KG/M2 | WEIGHT: 194.2 LBS | HEART RATE: 65 BPM | RESPIRATION RATE: 18 BRPM | DIASTOLIC BLOOD PRESSURE: 70 MMHG | TEMPERATURE: 97.5 F | HEIGHT: 71 IN | SYSTOLIC BLOOD PRESSURE: 132 MMHG

## 2024-11-06 PROBLEM — Z96.651 S/P TKR (TOTAL KNEE REPLACEMENT), RIGHT: Status: ACTIVE | Noted: 2024-11-06

## 2024-11-06 LAB
ANION GAP SERPL CALCULATED.3IONS-SCNC: 8.8 MMOL/L (ref 5–15)
BASOPHILS # BLD AUTO: 0.03 10*3/MM3 (ref 0–0.2)
BASOPHILS NFR BLD AUTO: 0.3 % (ref 0–1.5)
BUN SERPL-MCNC: 19 MG/DL (ref 8–23)
BUN/CREAT SERPL: 18.8 (ref 7–25)
CALCIUM SPEC-SCNC: 9.2 MG/DL (ref 8.6–10.5)
CHLORIDE SERPL-SCNC: 103 MMOL/L (ref 98–107)
CO2 SERPL-SCNC: 24.2 MMOL/L (ref 22–29)
CREAT SERPL-MCNC: 1.01 MG/DL (ref 0.76–1.27)
DEPRECATED RDW RBC AUTO: 43.3 FL (ref 37–54)
EGFRCR SERPLBLD CKD-EPI 2021: 75.2 ML/MIN/1.73
EOSINOPHIL # BLD AUTO: 0 10*3/MM3 (ref 0–0.4)
EOSINOPHIL NFR BLD AUTO: 0 % (ref 0.3–6.2)
ERYTHROCYTE [DISTWIDTH] IN BLOOD BY AUTOMATED COUNT: 12.6 % (ref 12.3–15.4)
GLUCOSE SERPL-MCNC: 117 MG/DL (ref 65–99)
HCT VFR BLD AUTO: 36.8 % (ref 37.5–51)
HGB BLD-MCNC: 12.7 G/DL (ref 13–17.7)
IMM GRANULOCYTES # BLD AUTO: 0.05 10*3/MM3 (ref 0–0.05)
IMM GRANULOCYTES NFR BLD AUTO: 0.5 % (ref 0–0.5)
LYMPHOCYTES # BLD AUTO: 0.93 10*3/MM3 (ref 0.7–3.1)
LYMPHOCYTES NFR BLD AUTO: 8.8 % (ref 19.6–45.3)
MCH RBC QN AUTO: 32.3 PG (ref 26.6–33)
MCHC RBC AUTO-ENTMCNC: 34.5 G/DL (ref 31.5–35.7)
MCV RBC AUTO: 93.6 FL (ref 79–97)
MONOCYTES # BLD AUTO: 1.06 10*3/MM3 (ref 0.1–0.9)
MONOCYTES NFR BLD AUTO: 10 % (ref 5–12)
NEUTROPHILS NFR BLD AUTO: 8.52 10*3/MM3 (ref 1.7–7)
NEUTROPHILS NFR BLD AUTO: 80.4 % (ref 42.7–76)
NRBC BLD AUTO-RTO: 0 /100 WBC (ref 0–0.2)
PLATELET # BLD AUTO: 170 10*3/MM3 (ref 140–450)
PMV BLD AUTO: 10.4 FL (ref 6–12)
POTASSIUM SERPL-SCNC: 4.2 MMOL/L (ref 3.5–5.2)
RBC # BLD AUTO: 3.93 10*6/MM3 (ref 4.14–5.8)
SODIUM SERPL-SCNC: 136 MMOL/L (ref 136–145)
WBC NRBC COR # BLD AUTO: 10.59 10*3/MM3 (ref 3.4–10.8)

## 2024-11-06 PROCEDURE — 63710000001 METOPROLOL SUCCINATE XL 50 MG TABLET SUSTAINED-RELEASE 24 HOUR: Performed by: ORTHOPAEDIC SURGERY

## 2024-11-06 PROCEDURE — 63710000001 ASPIRIN 81 MG TABLET DELAYED-RELEASE: Performed by: ORTHOPAEDIC SURGERY

## 2024-11-06 PROCEDURE — 97161 PT EVAL LOW COMPLEX 20 MIN: CPT

## 2024-11-06 PROCEDURE — 63710000001 MELOXICAM 7.5 MG TABLET: Performed by: ORTHOPAEDIC SURGERY

## 2024-11-06 PROCEDURE — 63710000001 OXYCODONE 5 MG TABLET: Performed by: ORTHOPAEDIC SURGERY

## 2024-11-06 PROCEDURE — 63710000001 ACETAMINOPHEN 325 MG TABLET: Performed by: ORTHOPAEDIC SURGERY

## 2024-11-06 PROCEDURE — 85025 COMPLETE CBC W/AUTO DIFF WBC: CPT | Performed by: ORTHOPAEDIC SURGERY

## 2024-11-06 PROCEDURE — A9270 NON-COVERED ITEM OR SERVICE: HCPCS | Performed by: ORTHOPAEDIC SURGERY

## 2024-11-06 PROCEDURE — 97165 OT EVAL LOW COMPLEX 30 MIN: CPT

## 2024-11-06 PROCEDURE — 80048 BASIC METABOLIC PNL TOTAL CA: CPT | Performed by: ORTHOPAEDIC SURGERY

## 2024-11-06 PROCEDURE — G0378 HOSPITAL OBSERVATION PER HR: HCPCS

## 2024-11-06 PROCEDURE — 63710000001 ROSUVASTATIN 5 MG TABLET: Performed by: ORTHOPAEDIC SURGERY

## 2024-11-06 RX ORDER — DOCUSATE SODIUM 100 MG/1
100 CAPSULE, LIQUID FILLED ORAL 2 TIMES DAILY PRN
Qty: 60 CAPSULE | Refills: 0 | Status: SHIPPED | OUTPATIENT
Start: 2024-11-06

## 2024-11-06 RX ADMIN — METOPROLOL SUCCINATE 50 MG: 50 TABLET, EXTENDED RELEASE ORAL at 08:31

## 2024-11-06 RX ADMIN — ACETAMINOPHEN 975 MG: 325 TABLET ORAL at 08:31

## 2024-11-06 RX ADMIN — ASPIRIN 81 MG: 81 TABLET, COATED ORAL at 08:31

## 2024-11-06 RX ADMIN — MELOXICAM 15 MG: 7.5 TABLET ORAL at 08:32

## 2024-11-06 RX ADMIN — OXYCODONE HYDROCHLORIDE 5 MG: 5 TABLET ORAL at 00:30

## 2024-11-06 RX ADMIN — ROSUVASTATIN CALCIUM 5 MG: 5 TABLET, FILM COATED ORAL at 08:31

## 2024-11-06 NOTE — DISCHARGE SUMMARY
" Orthopedic Discharge Summary      Patient: Cortez Delgado      YOB: 1945    Medical Record Number: [id number]    Attending Physician: Thanh White MD  Consulting Physician(s):   Date of Admission: 11/5/2024 11:23 AM  Date of Discharge: 11/6/2024        Primary osteoarthritis of right knee     Status Post: SD ARTHRP KNE CONDYLE&PLATU MEDIAL&LAT COMPARTMENTS [07264] (total knee arthroplasty and all associated procedures)      Allergies   Allergen Reactions    Penicillins Other (See Comments) and Rash     Please ask patient reaction       Current Medications:     Discharge Medications        New Medications        Instructions Start Date   BUPIVACAINE 0.125% IN 0.9% SODIUM CHLORIDE 400 ML ELASTOMERIC PUMP \"PAIN BALL\"   8 mL/hr (8 mL/hr), Intradermal, Continuous      docusate sodium 100 MG capsule  Commonly known as: COLACE   100 mg, Oral, 2 Times Daily PRN      docusate sodium 100 MG capsule  Commonly known as: COLACE   100 mg, Oral, 2 Times Daily PRN      doxycycline 100 MG capsule  Commonly known as: VIBRAMYCIN   100 mg, Oral, 2 Times Daily      famotidine 40 MG tablet  Commonly known as: Pepcid   40 mg, Oral, Daily      naloxone 4 MG/0.1ML nasal spray  Commonly known as: NARCAN   Call 911. Don't prime. [place] in 1 nostril for overdose. Repeat in 2-3 minutes in other nostril if no or minimal breathing/responsiveness.      ondansetron ODT 4 MG disintegrating tablet  Commonly known as: ZOFRAN-ODT   4 mg, Oral, Every 8 Hours PRN      oxyCODONE-acetaminophen 5-325 MG per tablet  Commonly known as: PERCOCET   1-2 tablets, Oral, Every 4 Hours PRN      senna 8.6 MG tablet  Commonly known as: SENOKOT   1 tablet, Oral, Nightly             Changes to Medications        Instructions Start Date   aspirin 81 MG EC tablet  What changed: when to take this   81 mg, Oral, 2 Times Daily             Continue These Medications        Instructions Start Date   FLINTSTONES W/IRON PO   Take  by mouth.    "   metoprolol succinate XL 50 MG 24 hr tablet  Commonly known as: TOPROL-XL   50 mg, Daily      NIACIN CR PO   500 mg, Daily      rosuvastatin 5 MG tablet  Commonly known as: CRESTOR   5 mg, Daily      valsartan 80 MG tablet  Commonly known as: DIOVAN   80 mg, Daily      vitamin B-12 1000 MCG tablet  Commonly known as: CYANOCOBALAMIN   1,000 mcg, Daily      vitamin D3 125 MCG (5000 UT) capsule capsule   5,000 Units, Daily                   Past Medical History:   Diagnosis Date    Arthritis     Colon polyps     Fracture of wrist     Approx 12 yrs ago    Frozen shoulder     Hyperlipidemia     Hypertension     Knee swelling     Periarthritis of shoulder     Sleep apnea     cpap    Tendinitis of knee      Past Surgical History:   Procedure Laterality Date    COLONOSCOPY      COLONOSCOPY N/A 10/30/2023    Procedure: COLONOSCOPY TO CECUM;  Surgeon: David Ryder MD;  Location: [facility] [place] MAIN OR;  Service: Gastroenterology;  Laterality: N/A;  diverticulosis, polyps,hemorrhoids    COLONOSCOPY W/ POLYPECTOMY N/A 04/20/2023    Procedure: COLONOSCOPY WITH POLYPECTOMY;  Surgeon: David Ryder MD;  Location: [facility] [place] MAIN OR;  Service: Gastroenterology;  Laterality: N/A;  DIVERTICULOSIS, POLYPS X4, HEMORRHOIDS    FRACTURE SURGERY      WRIST SURGERY Left     Approx 12 yrs ago     Social History     Occupational History    Not on file   Tobacco Use    Smoking status: Never     Passive exposure: Never    Smokeless tobacco: Never   Vaping Use    Vaping status: Never Used   Substance and Sexual Activity    Alcohol use: Never    Drug use: Never    Sexual activity: Defer      Social History     Social History Narrative    Not on file     History reviewed. No pertinent family history.      Physical Exam: 79 y.o. male  General Appearance:    Alert, cooperative, in no acute distress                      Vitals:    11/05/24 2029 11/05/24 2103 11/05/24 2311 11/06/24 0310   BP:  159/71 168/80 132/70   BP Location:   Left arm Left arm    Patient Position:   Sitting Sitting   Pulse:  53 57 65   Resp:  17 17 18   Temp:  96.3 °F (35.7 °C) 96.3 °F (35.7 °C) 97.5 °F (36.4 °C)   TempSrc:  Temporal Temporal Oral   SpO2: 96% 96% 97% 96%   Weight:       Height:            Head:    Normocephalic, without obvious abnormality, atraumatic   Eyes:            Lids and lashes normal, conjunctivae and sclerae normal, no   icterus, no pallor, corneas clear, PERRLA   Ears:    Ears appear intact with no abnormalities noted   Throat:   No oral lesions, no thrush, oral mucosa moist   Neck:   No adenopathy, supple, trachea midline, no thyromegaly, no    carotid bruit, no JVD   Back:     No kyphosis present, no scoliosis present, no skin lesions,       erythema or scars, no tenderness to percussion or                   palpation,   range of motion normal   Lungs:     Clear to auscultation,respirations regular, even and                   unlabored    Heart:    Regular rhythm and normal rate, normal S1 and S2, no            murmur, no gallop, no rub, no click   Chest Wall:    No abnormalities observed   Abdomen:     Normal bowel sounds, no masses, no organomegaly, soft        non-tender, non-distended, no guarding, no rebound                 tenderness   Rectal:     Deferred   Extremities:   Incision intact without signs or symptoms of infection.               Neurovascular status remains intact to operative extremity.      Moves all extremities well, no edema, no cyanosis, no              redness   Pulses:   Pulses palpable and equal bilaterally   Skin:   No bleeding, bruising or rash   Lymph nodes:   No palpable adenopathy   Neurologic:   Cranial nerves 2 - 12 grossly intact, sensation intact, DTR        present and equal bilaterally           Hospital Course:  79 y.o. male admitted to [facility] to services of Thanh White MD with Primary osteoarthritis of right knee [M17.11] on 11/5/2024 and underwent [place] ARTHRP KNE CONDYLE&PLATU MEDIAL&LAT COMPARTMENTS  [[id number]] (total knee arthroplasty and all associated procedures)  Per Thanh White MD. Antibiotic and VTE prophylaxis were per SCIP protocols. Post-operatively the patient transferred to the post-operative floor where the patient underwent mobilization therapy that included active as well as passive ROM exercises. Opioids were titrated to achieve appropriate pain management to allow for participation in mobilization exercises. Vital signs are now stable. The incision is intact without signs or symptoms of infection. Operative extremity neurovascular status remains intact.   Appropriate education re: incision care, activity levels, medications, and follow up visits was completed and all questions were answered. The patient is now deemed stable for discharge to Home.      DIAGNOSTIC TESTS:     Admission on 11/05/2024   Component Date Value Ref Range Status    Glucose 11/06/2024 117 (H)  65 - 99 mg/dL Final    BUN 11/06/2024 19  8 - 23 mg/dL Final    Creatinine 11/06/2024 1.01  0.76 - 1.27 mg/dL Final    Sodium 11/06/2024 136  136 - 145 mmol/L Final    Potassium 11/06/2024 4.2  3.5 - 5.2 mmol/L Final    Chloride 11/06/2024 103  98 - 107 mmol/L Final    CO2 11/06/2024 24.2  22.0 - 29.0 mmol/L Final    Calcium 11/06/2024 9.2  8.6 - 10.5 mg/dL Final    BUN/Creatinine Ratio 11/06/2024 18.8  7.0 - 25.0 Final    Anion Gap 11/06/2024 8.8  5.0 - 15.0 mmol/L Final    eGFR 11/06/2024 75.2  >60.0 mL/min/1.73 Final    WBC 11/06/2024 10.59  3.40 - 10.80 10*3/mm3 Final    RBC 11/06/2024 3.93 (L)  4.14 - 5.80 10*6/mm3 Final    Hemoglobin 11/06/2024 12.7 (L)  13.0 - 17.7 g/dL Final    Hematocrit 11/06/2024 36.8 (L)  37.5 - 51.0 % Final    MCV 11/06/2024 93.6  79.0 - 97.0 fL Final    MCH 11/06/2024 32.3  26.6 - 33.0 pg Final    MCHC 11/06/2024 34.5  31.5 - 35.7 g/dL Final    RDW 11/06/2024 12.6  12.3 - 15.4 % Final    RDW-SD 11/06/2024 43.3  37.0 - 54.0 fl Final    MPV 11/06/2024 10.4  6.0 - 12.0 fL Final    Platelets 11/06/2024  170  140 - 450 10*3/mm3 Final    Neutrophil % 11/06/2024 80.4 (H)  42.7 - 76.0 % Final    Lymphocyte % 11/06/2024 8.8 (L)  19.6 - 45.3 % Final    Monocyte % 11/06/2024 10.0  5.0 - 12.0 % Final    Eosinophil % 11/06/2024 0.0 (L)  0.3 - 6.2 % Final    Basophil % 11/06/2024 0.3  0.0 - 1.5 % Final    Immature Grans % 11/06/2024 0.5  0.0 - 0.5 % Final    Neutrophils, Absolute 11/06/2024 8.52 (H)  1.70 - 7.00 10*3/mm3 Final    Lymphocytes, Absolute 11/06/2024 0.93  0.70 - 3.10 10*3/mm3 Final    Monocytes, Absolute 11/06/2024 1.06 (H)  0.10 - 0.90 10*3/mm3 Final    Eosinophils, Absolute 11/06/2024 0.00  0.00 - 0.40 10*3/mm3 Final    Basophils, Absolute 11/06/2024 0.03  0.00 - 0.20 10*3/mm3 Final    Immature Grans, Absolute 11/06/2024 0.05  0.00 - 0.05 10*3/mm3 Final    nRBC 11/06/2024 0.0  0.0 - 0.2 /100 WBC Final       No results found.    Discharge and Follow up Instructions:   Weightbearing as tolerated right lower extremity  Follow-up home health PT  Aspirin for DVT prophylaxis  [facility] regarding his urinary catheter  Follow-up in Ortho office 2 weeks previously scheduled appointment      Date: 11/6/2024    MYRNA Jones

## 2024-11-06 NOTE — CASE MANAGEMENT/SOCIAL WORK
Continued Stay Note  EMY Tsang     Patient Name: Cortez Delgado  MRN: [id number]  Today's Date: 11/6/2024    Admit Date: 11/5/2024    Plan: Plan home w wife/[facility]HH to follow   Discharge Plan       Row Name 11/06/24 1045       Plan    Plan Plan home w wife/[facility] to follow    Patient/Family in Agreement with Plan yes    Plan Comments Follow up visit with patient and his wife at bedside. Verified patients wife is able to assist at dc.  RW/BSC have been delivered and [facility] will follow for in home PT. No additional needs noted. Anticipate dc home this morning.                   Discharge Codes    No documentation.                 Expected Discharge Date and Time       Expected Discharge Date Expected Discharge Time    Nov 6, 2024               Mikey Pedersen RN

## 2024-11-06 NOTE — PLAN OF CARE
Goal Outcome Evaluation:  Plan of Care Reviewed With: patient        Progress: no change  Outcome Evaluation: VSS, good pain control with PO meds, f/c placed for urinary retention (blood clots and blood noted with insertion- Urology consulted), up with assist x1, walker, gaitbelt, ambulated in muse, tolerating diet, family at bedside, admit to floor from PACU

## 2024-11-06 NOTE — PROGRESS NOTES
Spoke with nurse about Post op Urinary retention.     Needs voiding trial at [facility] contin[facility] center in 2-3 days- schedulers messaged.     Needs clinic visit with PRASANTH Richey in 2-3 weeks - schedulers messaged.    Urology signing off

## 2024-11-06 NOTE — PLAN OF CARE
Goal Outcome Evaluation:  Plan of Care Reviewed With: patient           Outcome Evaluation: PT Evaluation complete.  Patient performs supine to sit with SBA and sit to stand with SBA.  patient performs gait with rolling walker x200 feet, SBA.  Patient ascends/descends 5 steps with 1 handrail, CGA.  Patient issued written LE HEP and reviewed.  Patient reports no concerns regarding return home at this time.  Recommend home health PT, pt needs BSC and RW.  No further PT needs in acute care setting.    Anticipated Discharge Disposition (PT): home with home health

## 2024-11-06 NOTE — DISCHARGE INSTR - APPOINTMENTS
Patient has follow up appointment with Dr. White on 11/21/2024 at 10:10                                 [phone]    Patient will need to call Dr. Marroquin office to schedule 1-2 week follow up appointment. (I called twice and was sent to [facility])          [phone]       General Sunscreen Counseling: I recommended a broad spectrum sunscreen with a SPF of 30 or higher.  I explained that SPF 30 sunscreens block approximately 97 percent of the sun's harmful rays.  Sunscreens should be applied at least 15 minutes prior to expected sun exposure and then every 2 hours after that as long as sun exposure continues. If swimming or exercising sunscreen should be reapplied every 45 minutes to an hour after getting wet or sweating.  One ounce, or the equivalent of a shot glass full of sunscreen, is adequate to protect the skin not covered by a bathing suit. I also recommended a lip balm with a sunscreen as well. Sun protective clothing can be used in lieu of sunscreen but must be worn the entire time you are exposed to the sun's rays. Detail Level: Generalized Products Recommended: Zinc sunscreen, Elta MD UV, OCTAVIO Amaral Zinc, Katina Green Posay, lip balm with SPF

## 2024-11-06 NOTE — NURSING NOTE
Called orthopedic on call, informed doctor that patient is having urinary retention, bladder scan done, a little over 300 noted, f/c was inserted due to a couple straight cath I/O attempts leading to need for a coude and clots/bleeding noted with insertion. Consult placed for Urology for the AM and to leave f/c to bedside drain till Urology sees patient.

## 2024-11-06 NOTE — PROGRESS NOTES
POD# 1 s/p right TKA    Subjective:Patient states pain controlled overnight, denies fevers chills or sweats overnight, denies any residual numbness or tingling to operative extremity.  No calf pain or swelling.    Objective:  Vitals:    11/05/24 2029 11/05/24 2103 11/05/24 2311 11/06/24 0310   BP:  159/71 168/80 132/70   BP Location:   Left arm Left arm   Patient Position:   Sitting Sitting   Pulse:  53 57 65   Resp:  17 17 18   Temp:  96.3 °F (35.7 °C) 96.3 °F (35.7 °C) 97.5 °F (36.4 °C)   TempSrc:  Temporal Temporal Oral   SpO2: 96% 96% 97% 96%   Weight:       Height:           Results from last 7 days   Lab Units 11/06/24  0509   WBC 10*3/mm3 10.59   HEMOGLOBIN g/dL 12.7*   HEMATOCRIT % 36.8*   PLATELETS 10*3/mm3 170       Results from last 7 days   Lab Units 11/06/24  0509   SODIUM mmol/L 136   POTASSIUM mmol/L 4.2   CHLORIDE mmol/L 103   CO2 mmol/L 24.2   BUN mg/dL 19   CREATININE mg/dL 1.01   GLUCOSE mg/dL 117*   CALCIUM mg/dL 9.2       Exam:    Right knee- dressing clean, dry, intact   Flex and extend toes and ankle   No calf pain, negative Homans sign   Positive sensation light touch right foot   Brisk cap refill all digits, palpable dorsalis pedis pulse    Impression: s/p right TKA    Plan:  1. PT/OT- weight-bear as tolerated, ambulation, range of motion  2. Pain control-  oxycodone, Tylenol  3. DVT prophylaxis-  aspirin twice a day  4. Wound care-leave mesh and glue in place until follow-up visit  5. Disposition- home with home health once medically stable and cleared by PT; [facility] will be contacted by [facility] to schedule appointment.  Follow-up in Ortho office 2 weeks previously scheduled appointment.

## 2024-11-06 NOTE — THERAPY DISCHARGE NOTE
Acute Care - Occupational Therapy Initial Evaluation/Discharge  [facility]     Patient Name: Cortez Delgado  : 1945  MRN: [id number]  Today's Date: 2024  Onset of Illness/Injury or Date of Surgery: 24  Date of Referral to OT: 24  Referring Physician: Dr White      Admit Date: 2024       ICD-10-CM ICD-9-CM   1. S/P total knee arthroplasty, right  Z96.651 V43.65   2. Primary osteoarthritis of right knee  M17.11 715.16     Patient Active Problem List   Diagnosis    Encounter for screening for malignant neoplasm of colon    Personal history of colonic polyps    Primary osteoarthritis of right knee    S/P TKR (total knee replacement), right     Past Medical History:   Diagnosis Date    Arthritis     Colon polyps     Fracture of wrist     Approx 12 yrs ago    Frozen shoulder     Hyperlipidemia     Hypertension     Knee swelling     Periarthritis of shoulder     Sleep apnea     cpap    Tendinitis of knee      Past Surgical History:   Procedure Laterality Date    COLONOSCOPY      COLONOSCOPY N/A 10/30/2023    Procedure: COLONOSCOPY TO CECUM;  Surgeon: David Ryder MD;  Location: [facility] MAIN OR;  Service: Gastroenterology;  Laterality: N/A;  diverticulosis, polyps,hemorrhoids    COLONOSCOPY W/ POLYPECTOMY N/A 2023    Procedure: COLONOSCOPY WITH POLYPECTOMY;  Surgeon: David Ryder MD;  Location: [facility] [place] MAIN OR;  Service: Gastroenterology;  Laterality: N/A;  DIVERTICULOSIS, POLYPS X4, HEMORRHOIDS    FRACTURE SURGERY      WRIST SURGERY Left     Approx 12 yrs ago       OT ASSESSMENT FLOWSHEET (Last 12 Hours)       OT Evaluation and Treatment       Row Name 24 0831                   OT Time and Intention    Subjective Information complains of;pain  -JJ        Document Type discharge evaluation/summary  -JJ        Mode of Treatment occupational therapy  -JJ        Patient Effort excellent  -JJ        Symptoms Noted During/After Treatment none  -JJ           General  Information    Patient Profile Reviewed yes  -JJ        Onset of Illness/Injury or Date of Surgery 11/05/24  -JJ        Referring Physician Dr White  -JJ        General Observations of Patient pt supine in bed, agreed to evaluation, family present in room  -JJ        Prior Level of Function independent:;all household mobility;transfer;bed mobility;ADL's  -JJ        Equipment Currently Used at Home commode, 3-in-1;walker, rolling  -JJ        Pertinent History of Current Functional Problem pt sp R TKA  -JJ        Existing Precautions/Restrictions fall  -JJ        Risks Reviewed patient:;increased discomfort  -JJ        Benefits Reviewed patient:;improve function;increase independence  -JJ        Barriers to Rehab none identified  -JJ           Living Environment    Current Living Arrangements home  -JJ        Home Accessibility stairs to enter home  -JJ        People in Home spouse  -JJ           Home Main Entrance    Number of Stairs, Main Entrance five  -JJ        Stair Railings, Main Entrance railing on left side (ascending)  -JJ           Pain Assessment    Pretreatment Pain Rating 5/10  -JJ        Posttreatment Pain Rating 5/10  -JJ        Pain Location knee  -JJ        Pain Management Interventions cold applied;movement retraining implemented  -JJ           Cognition    Orientation Status (Cognition) oriented x 3  -JJ        Follows Commands (Cognition) WFL  -JJ        Personal Safety Interventions gait belt;nonskid shoes/slippers when out of bed  -JJ           Range of Motion (ROM)    Range of Motion bilateral upper extremities;ROM is WFL  -JJ           Strength (Manual Muscle Testing)    Strength (Manual Muscle Testing) bilateral upper extremities;strength is WFL  -JJ           Activities of Daily Living    BADL Assessment/Intervention lower body dressing  -JJ           Lower Body Dressing Assessment/Training    Comment, (Lower Body Dressing) anticipate pt will require min assist for lb adls. pt states family  able to assist as needed  -           Bed Mobility    Comment, (Bed Mobility) deferred up at EOB  -           Functional Mobility    Functional Mobility- Ind. Level standby assist  -        Functional Mobility- Device walker, front-wheeled  -        Functional Mobility-Distance (Feet) 200  -JJ           Transfer Assessment/Treatment    Transfers sit-stand transfer;stand-sit transfer  -           Sit-Stand Transfer    Sit-Stand [place] (Transfers) supervision;verbal cues  -        Assistive Device (Sit-Stand Transfers) walker, front-wheeled  -JJ           Stand-Sit Transfer    Stand-Sit [place] (Transfers) supervision;verbal cues  -        Assistive Device (Stand-Sit Transfers) walker, front-wheeled  -           Balance    Comment, Balance SBA for static standing balance with RW  -J           Wound 11/05/24 1617 Right anterior knee    Wound - Properties Group Placement Date: 11/05/24 -SJ Placement Time: 1617 -SJ Side: Right  -SJ Orientation: anterior  -SJ Location: knee  -SJ Primary Wound Type: Incision  -SJ Present on Original Admission: N  -SJ Additional Comments: exofin, telfa, ABD, webril, ACE, immobilizer, cold wrap  -SJ    Retired Wound - Properties Group Placement Date: 11/05/24  - Placement Time: 1617 -SJ Present on Original Admission: N  -SJ Side: Right  -SJ Orientation: anterior  -SJ Location: knee  -SJ Primary Wound Type: Incision  -SJ Additional Comments: exofin, telfa, ABD, webril, ACE, immobilizer, cold wrap  -SJ    Retired Wound - Properties Group Placement Date: 11/05/24  -SJ Placement Time: 1617 -SJ Present on Original Admission: N  -SJ Side: Right  -SJ Orientation: anterior  -SJ Location: knee  -SJ Primary Wound Type: Incision  -SJ Additional Comments: exofin, telfa, ABD, webril, ACE, immobilizer, cold wrap  -SJ    Retired Wound - Properties Group Date first assessed: 11/05/24 - Time first assessed: 1617 -SJ Present on Original Admission: N  -SJ Side: Right  -SJ  Location: knee  -SJ Primary Wound Type: Incision  -SJ Additional Comments: exofin, telfa, ABD, webril, ACE, immobilizer, cold wrap  -SJ       Plan of Care Review    Plan of Care Reviewed With patient  -JJ        Outcome Evaluation OT evaluation completed. pt required SBA for functional transfers from EOB with RW. pt required SBA for functional mobility x 200 feet with RW. anticipate pt will require min assist with LB adls. pt states family able to assist as needed. plan to discharge home today with family assist and HH therapy. no skilled OT recommendations at this time.  -JJ           Positioning and Restraints    Pre-Treatment Position in bed  -JJ        Post Treatment Position chair  -JJ        In Chair sitting;call light within reach;encouraged to call for assist;with family/caregiver  -J           Therapy Assessment/Plan (OT)    Date of Referral to OT 11/06/24  -JJ        Patient/Family Therapy Goal Statement (OT) decrease pain  -JJ        OT Diagnosis decreased adl sp knee sx  -JJ        Criteria for Skilled Therapeutic Interventions Met (OT) no;no problems identified which require skilled intervention  -JJ        Therapy Frequency (OT) evaluation only  -JJ           Evaluation Complexity (OT)    Overall Complexity of Evaluation (OT) low complexity  -JJ           Therapy Plan Review/Discharge Plan (OT)    Anticipated Discharge Disposition (OT) home with home health;home with assist  -JJ                  User Key  (r) = Recorded By, (t) = Taken By, (c) = Cosigned By      Initials Name Effective Dates    Vera No, OTR 06/16/21 -     Vic Vivas, RN 06/30/23 -                     Occupational Therapy Education       Title: PT OT SLP Therapies (Resolved)       Topic: Occupational Therapy (Resolved)       Point: ADL training (Resolved)       Description:   Instruct learner(s) on proper safety adaptation and remediation techniques during self care or transfers.   Instruct in proper use of  assistive devices.                  Learning Progress Summary            Patient Acceptance, E,TB, VU by KARELY at 11/6/2024 1035    Comment: pt educated on adls and safety with functional transfers and mobility                                      User Key       Initials Effective Dates Name Provider Type Discipline    KARELY 06/16/21 -  Vera Iniguez, OTR Occupational Therapist OT                    OT Recommendation and Plan  Therapy Frequency (OT): evaluation only  Plan of Care Review  Plan of Care Reviewed With: patient  Outcome Evaluation: OT evaluation completed. pt required SBA for functional transfers from EOB with RW. pt required SBA for functional mobility x 200 feet with RW. anticipate pt will require min assist with LB adls. pt states family able to assist as needed. plan to discharge home today with family assist and HH therapy. no skilled OT recommendations at this time.  Plan of Care Reviewed With: patient  Outcome Evaluation: OT evaluation completed. pt required SBA for functional transfers from EOB with RW. pt required SBA for functional mobility x 200 feet with RW. anticipate pt will require min assist with LB adls. pt states family able to assist as needed. plan to discharge home today with family assist and HH therapy. no skilled OT recommendations at this time.          Outcome Measures       Row Name 11/06/24 0831             How much help from another is currently needed...    Putting on and taking off regular lower body clothing? 3  -JJ      Bathing (including washing, rinsing, and drying) 3  -JJ      Toileting (which includes using toilet bed pan or urinal) 1  -JJ      Putting on and taking off regular upper body clothing 4  -JJ      Taking care of personal grooming (such as brushing teeth) 4  -JJ      Eating meals 4  -JJ      AM-PAC 6 Clicks Score (OT) 19  -JJ         Functional Assessment    Outcome Measure Options AM-PAC 6 Clicks Daily Activity (OT)  -J                User Key  (r) =  Recorded By, (t) = Taken By, (c) = Cosigned By      Initials Name Provider Type    Vera No OTR Occupational Therapist                    Time Calculation:    Time Calculation- OT       Row Name 11/06/24 1036             Time Calculation- OT    OT Start Time 0831  -J      OT Stop Time 0845  -      OT Time Calculation (min) 14 min  -                User Key  (r) = Recorded By, (t) = Taken By, (c) = Cosigned By      Initials Name Provider Type    Vera No OTR Occupational Therapist                    Therapy Charges for Today       Code Description Service Date Service Provider Modifiers Qty    [id number] HC OT EVAL LOW COMPLEXITY 1 11/6/2024 Vera Iniguez OTR GO 1                 OT Discharge Summary  Anticipated Discharge Disposition (OT): home with home health, home with assist    MICHAEL Erazo  11/6/2024

## 2024-11-06 NOTE — PLAN OF CARE
Goal Outcome Evaluation:  Plan of Care Reviewed With: patient           Outcome Evaluation: OT evaluation completed. pt required SBA for functional transfers from EOB with RW. pt required SBA for functional mobility x 200 feet with RW. anticipate pt will require min assist with LB adls. pt states family able to assist as needed. plan to discharge home today with family assist and HH therapy. no skilled OT recommendations at this time.    Anticipated Discharge Disposition (OT): home with home health, home with assist

## 2024-11-06 NOTE — ANESTHESIA POSTPROCEDURE EVALUATION
Patient: Cortez Delgado    Procedure Summary       Date: 11/05/24 Room / Location:  [facility] OR 3 /  [facility] OR    Anesthesia Start: 1429 Anesthesia Stop: 1746    Procedure: total knee arthroplasty and all associated procedures (Right: Knee) Diagnosis:       Primary osteoarthritis of right knee      (Primary osteoarthritis of right knee [M17.11])    Surgeons: Thanh White MD Provider: Vera Jack CRNA    Anesthesia Type: MAC, spinal, regional ASA Status: 2            Anesthesia Type: MAC, spinal, regional    Vitals  Vitals Value Taken Time   /86 11/05/24 1900   Temp 97.6 °F (36.4 °C) 11/05/24 1750   Pulse 48 11/05/24 1906   Resp 14 11/05/24 1850   SpO2 100 % 11/05/24 1906   Vitals shown include unfiled device data.        Post Anesthesia Care and Evaluation    Patient location during evaluation: PHASE II  Patient participation: complete - patient participated  Level of consciousness: sleepy but conscious (Oriented x 3, but not back to baseline. Formerly combative when emerging from anesthesia. See progress note.)  Pain score: 0  Pain management: adequate    Airway patency: patent  Anesthetic complications: No anesthetic complications  PONV Status: none  Cardiovascular status: acceptable  Respiratory status: acceptable, room air and spontaneous ventilation  Hydration status: acceptable  Post Neuraxial Block status: Motor and sensory function returned to baseline  Comments: Pt doing RLE leg raises in bed and denies any pain. Being admitted for postoperative confusion. See progress note.

## 2024-11-06 NOTE — CASE MANAGEMENT/SOCIAL WORK
Case Management Discharge Note      Final Note: dc home/[facility]         Selected Continued Care - Discharged on 11/6/2024 Admission date: 11/5/2024 - Discharge disposition: Home or Self Care      Destination    No services have been selected for the patient.                Durable Medical Equipment       Service Provider Services Address Phone Fax Patient Preferred    [facility] [address], [place] [id number] [phone] --              Dialysis/Infusion    No services have been selected for the patient.                Home Medical Care    No services have been selected for the patient.                Therapy    No services have been selected for the patient.                Community Resources    No services have been selected for the patient.                Community & DME    No services have been selected for the patient.                         Final Discharge Disposition Code: 06 - home with home health care

## 2024-11-06 NOTE — NURSING NOTE
Spoke with Dr. Richey with Urology, will arrange for f/u in office for post-op urinary retention, will not be coming to [place] today, patient to be sent home with f/c

## 2024-11-06 NOTE — PROGRESS NOTES
Patient: Cortez Delgado  Procedure(s):  total knee arthroplasty and all associated procedures  Anesthesia type: MAC, spinal, regional    Patient location: [facility] Floor  Last vitals:   Vitals:    11/06/24 0310   BP: 132/70   Pulse: 65   Resp: 18   Temp: 97.5 °F (36.4 °C)   SpO2: 96%     Level of consciousness: awake, alert, and oriented    Post-anesthesia pain: adequate analgesia  Airway patency: patent  Respiratory: unassisted  Cardiovascular: stable and blood pressure at baseline  Hydration: euvolemic    Anesthetic complications: no    Patient sitting up in chair. Alert and oriented. Has been ambulating in muse with assistance. Apparently experienced some post op delerium in PACU after surgery, which appears resolved now. He says he anticipates going home today.

## 2024-11-06 NOTE — PROGRESS NOTES
In postop, pt was confused upon emerging from anesthesia (pulling off monitors, shoving CRNA in chest). Pt did not appear malicious in intent, but potentially delirious. Wife was at bedside and informed team that pt had never emerged from anesthesia in this way and expressed concerns about being able to care for him at home in this state. Preop the patient received 0.5mg versed for PNB, but propofol was the only sedative agent utilized intraoperatively. Informed Dr. White of pt's confusion and potential need for admission. Verbalized understanding.     1840 Pt lethargic, but oriented x 3. Much less confused, but still not at baseline. Dr. White was contacted about placing admission orders for the patient. Patient and wife were informed of admission and wife agreed that this would be best for the evening.     Will follow-up tomorrow during rounds.

## 2024-11-07 ENCOUNTER — READMISSION MANAGEMENT (OUTPATIENT)
Dept: CALL CENTER | Facility: HOSPITAL | Age: 79
End: 2024-11-07
Payer: MEDICARE

## 2024-11-07 ENCOUNTER — HOME CARE VISIT (OUTPATIENT)
Dept: HOME HEALTH SERVICES | Facility: HOME HEALTHCARE | Age: 79
End: 2024-11-07
Payer: MEDICARE

## 2024-11-07 PROCEDURE — G0151 HHCP-SERV OF PT,EA 15 MIN: HCPCS

## 2024-11-07 NOTE — HOME HEALTH
"REASON FOR REFERRAL: 79  year old male  presents with complaints of :   decreased ambulation, knee pain, difficulty walking following recent R TKA . Initially was d/c home with catheter due to urinary retention but was removed at urologist office this am prior to PT arrival for SOC appt.    Home health ordered for: PT      SUBJECTIVE: \"It just feels so tight\"      DIAGNOSIS/FOCUS OF CARE:   R TKA 11/5/24    Dr White                    PERTINENT MEDICAL HISTORY:   Arthritis   Colon polyps   Fracture of wrist Approx 12 yrs ago   Frozen shoulder   Hyperlipidemia   Hypertension   Knee swelling   Periarthritis of shoulder   Sleep apnea cpap   Tendinitis of knee    PRIOR LEVEL OF FUNCTION: patient was independent with self care and mobility prior to TKA    PATIENT PHYSICAL THERAPY GOAL(S): \"I just want to be better\"     SOCIAL ENVIRONMENT/ DME /POTENTIAL BARRIERS FOR GOAL ATTAINMENT : lives with wife and adult daughter with autism in trilevel home; has RW; SPC; shower chair;     SKIN INTEGRITY/ WOUND STATUS:  see wound care screen for details; incision anteiror knee - open to air and secured with adhesive glue strip    CODE STATUS:  full    MEDICATION ISSUES/ CONCERNS:  none identified    HOMEBOUND STATUS: yes - see homebound screen for details    PROBLEMS IDENTIFIED: see care plan    FUNCTIONAL STATUS/ FALL RISK/ SAFETY: see physical therapy evaluation/ care plan     ASSESSMENT: patient with limited use of RW .  has been frequently navigating stairs which may lead to unnecessary inflammation and pain over subsequent days. expected post-op pain and edema at this time      ____________________________  PLAN FOR NEXT VISIT:   MEDICAL NECESSITY FOR ONGOING SKILLED THERAPY:  Skilled physical therapy is medically necessary for treatment of: gait, transfer, ROM, strength and balance deficits following recent hospitalization for:  R TKA.  Requires instruction in appropriate progression of exercises; education in fall " prevention/pain/edema management; gait training to reduce reliance on assistive device; balance retraining to prevent falls.  Without skilled physical therapy, patient at risk for: falls, rehospitalization, increased reliance on caregiver, chronic pain,       SPECIFIC INTERVENTIONS AND GOALS TO ADDRESS ON NEXT VISIT:  - progress HEP to include: standing exercises when appropriate  - gait training to restore normal mechanics  - fall prevention education  - continued home safety education  - increase AROM R knee greater than 28-82 degrees  - transfer training    FREQUENCY AND DURATION:  - 2 week 1; 3 week 1; 2 week 1    ANY OTHER FOLLOW UP NEEDED: none    DATE OF NEXT APPOINTMENT WITH DOCTOR:  Dr White 11/21/24    REASSESSMENT DUE DATE: 30 day:  12/6/24     Dr. White electronically notified of POC via case communication on  11/7/24

## 2024-11-07 NOTE — Clinical Note
Patient admitted to [facility] PT services 2 week 1; 3 week 1; 2 week 1 for ther-ex instruction, gait training, fall prevention, pain/edema management and transfer training following recent TKA

## 2024-11-07 NOTE — OUTREACH NOTE
Prep Survey      Flowsheet Row Responses   Religion facility patient discharged from? [place]   Is LACE score < 7 ? Yes   Eligibility Readm Mgmt   Discharge diagnosis Primary osteoarthritis of right knee   Does the patient have one of the following disease processes/diagnoses(primary or secondary)? Other   Does the patient have Home health ordered? Yes   What is the Home health agency?  [facility]   Is there a DME ordered? Yes   What DME was ordered? [employer]--RW/BSC delivered to bedside   Prep survey completed? Yes            Kristy MADSEN - Registered Nurse

## 2024-11-08 ENCOUNTER — HOME CARE VISIT (OUTPATIENT)
Dept: HOME HEALTH SERVICES | Facility: HOME HEALTHCARE | Age: 79
End: 2024-11-08
Payer: MEDICARE

## 2024-11-08 ENCOUNTER — READMISSION MANAGEMENT (OUTPATIENT)
Dept: CALL CENTER | Facility: HOSPITAL | Age: 79
End: 2024-11-08
Payer: MEDICARE

## 2024-11-08 VITALS
DIASTOLIC BLOOD PRESSURE: 66 MMHG | OXYGEN SATURATION: 99 % | TEMPERATURE: 98 F | RESPIRATION RATE: 18 BRPM | HEART RATE: 56 BPM | SYSTOLIC BLOOD PRESSURE: 132 MMHG

## 2024-11-08 VITALS
DIASTOLIC BLOOD PRESSURE: 56 MMHG | HEART RATE: 61 BPM | TEMPERATURE: 97.1 F | SYSTOLIC BLOOD PRESSURE: 136 MMHG | OXYGEN SATURATION: 93 %

## 2024-11-08 PROCEDURE — G0151 HHCP-SERV OF PT,EA 15 MIN: HCPCS

## 2024-11-08 PROCEDURE — G0152 HHCP-SERV OF OT,EA 15 MIN: HCPCS

## 2024-11-08 NOTE — HOME HEALTH
SUBJECTIVE: My 3 boys are coming over tomorrow to help with some things.     No new med changes.  No recent Falls.      ASSESSMENT: patient using RW during PT presence but wife reports that he has been navigating stairs and alternate levels of home without device. patient reports no LOB and manageable pain. will be ready to transition to standing exercises next visit      SKILL/EDUCATION PROVIDED: see interventions for details  PATIENT/CAREGIVER RESPONSE: see interventions for details    ____________    PLAN FOR NEXT VISIT:   MEDICAL NECESSITY FOR ONGOING SKILLED THERAPY: Skilled physical therapy is medically necessary for treatment of: gait, transfer, ROM, strength and balance deficits following recent hospitalization for: R TKA. Requires instruction in appropriate progression of exercises; education in fall prevention/pain/edema management; gait training to reduce reliance on assistive device; balance retraining to prevent falls. Without skilled physical therapy, patient at risk for: falls, rehospitalization, increased reliance on caregiver, chronic pain,     SPECIFIC INTERVENTIONS AND GOALS TO ADDRESS ON NEXT VISIT:   - progress HEP to include: standing exercises when appropriate   - gait training to restore normal mechanics ; cane trial  - fall prevention education   - increase AROM R knee greater than 28-82 degrees   - transfer training     FREQUENCY AND DURATION:   - 2 week 1; 3 week 1; 2 week 1   ANY OTHER FOLLOW UP NEEDED: none   DATE OF NEXT APPOINTMENT WITH DOCTOR: Dr White 11/21/24   REASSESSMENT DUE DATE: 30 day: 12/6/24

## 2024-11-08 NOTE — Clinical Note
Dear Ms. Jolley,   OT eval completed with no skilled need indicated. Pt is MI with all ADLs. HH PT remains in the home.   Thank you,   Kiesha Colbert OTR/L

## 2024-11-08 NOTE — HOME HEALTH
"REASON FOR REFERRAL: Pt is an 80 y/o male s/p R TKR.   PMHx: OA  OT's FOCUS OF CARE: home safety   SUBJECTIVE: \"I'm doing ok. I'm ready to get rid of this walker, but I'll keep it a little longer.\"  SOCIAL & ENVIRONMENTAL SITUATION: Pt lives in a multilevel home with his spouse and adult special needs daughter. Pt has access to a bedroom and full bath on his bottom floor and spouse is assisting with ADLs/IADLs as needed.   PATIENT'S &/OR CAREGIVER'S GOAL: Pt wishes to graduate from the .  INTERVENTIONS: home safety edu  ASSESSMENT: No skilled OT services indicated. Pt MI with all ADLs and receptive to OT recommendation to slow down transfers and to consider use of grab bar in back of shower.   PLAN: OT eval only  PLAN FOR NEXT VISIT: N/A"

## 2024-11-08 NOTE — OUTREACH NOTE
LAG < 7 Survey      Flowsheet Row Responses   [facility] patient discharged from? [place]   Does the patient have one of the following disease processes/diagnoses(primary or secondary)? Other   B[facility]AG <7 Attempt successful? Yes   Call start time 1626   Call end time 1631   List who call center can speak with pt   Meds reviewed with patient/caregiver? Yes   Is the patient having any side effects they believe may be caused by any medication additions or changes? No   Does the patient have all medications ordered at discharge? Yes   Is the patient taking all medications as directed (includes completed medication regime)? Yes   Comments regarding appointments Pt to see surgoen on 11-.   Does the patient have a primary care provider?  Yes   Has the patient kept scheduled appointments due by today? Yes   Has home health visited the patient within 72 hours of discharge? Yes   Psychosocial issues? No   Did the patient receive a copy of their discharge instructions? Yes   Nursing interventions Reviewed instructions with patient   What is the patient's perception of their health status since discharge? Improving   Is the patient/caregiver able to teach back signs and symptoms related to disease process for when to call PCP? Yes   Is the patient/caregiver able to teach back signs and symptoms related to disease process for when to call 911? Yes   Is the patient/caregiver able to teach back the hierarchy of who to call/visit for symptoms/problems? PCP, Specialist, Home health nurse, Urgent Care, ED, 911 Yes   Graduated Yes   Wrap up additional comments Pt reports improving. Pt has all medications. Pt understands pot-op instructions. Incision intact with dressing applied. HH is visiting.            Kasie LEWIS - Registered Nurse

## 2024-11-11 ENCOUNTER — HOME CARE VISIT (OUTPATIENT)
Dept: HOME HEALTH SERVICES | Facility: HOME HEALTHCARE | Age: 79
End: 2024-11-11
Payer: MEDICARE

## 2024-11-11 PROCEDURE — G0151 HHCP-SERV OF PT,EA 15 MIN: HCPCS

## 2024-11-11 NOTE — HOME HEALTH
"SUBJECTIVE:\" Do I still need to use this ace wrap and ice?\"  Reports has been compliant with exercises  BID    No new med changes.   No recent Falls.     ASSESSMENT: intermittent use of AD. beginning to transition to use of cane. ROM improving.  Moderate edema       SKILL/EDUCATION PROVIDED: see interventions for details   PATIENT/CAREGIVER RESPONSE: see interventions for details   ____________   PLAN FOR NEXT VISIT:   MEDICAL NECESSITY FOR ONGOING SKILLED THERAPY: Skilled physical therapy is medically necessary for treatment of: gait, transfer, ROM, strength and balance deficits following recent hospitalization for: R TKA. Requires instruction in appropriate progression of exercises; education in fall prevention/pain/edema management; gait training to reduce reliance on assistive device; balance retraining to prevent falls. Without skilled physical therapy, patient at risk for: falls, rehospitalization, increased reliance on caregiver, chronic pain,     SPECIFIC INTERVENTIONS AND GOALS TO ADDRESS ON NEXT VISIT:   - progress HEP to include: standing exercises when appropriate   - gait training to restore normal mechanics ; cane trial   - fall prevention education   - increase AROM R knee greater than 8-102 degrees      FREQUENCY AND DURATION:   - 2 week 1; 3 week 1; 2 week 1     ANY OTHER FOLLOW UP NEEDED: none     DATE OF NEXT APPOINTMENT WITH DOCTOR: Dr White 11/21/24     REASSESSMENT DUE DATE: 30 day: 12/6/24"

## 2024-11-14 ENCOUNTER — HOME CARE VISIT (OUTPATIENT)
Dept: HOME HEALTH SERVICES | Facility: HOME HEALTHCARE | Age: 79
End: 2024-11-14
Payer: MEDICARE

## 2024-11-14 PROCEDURE — G0151 HHCP-SERV OF PT,EA 15 MIN: HCPCS
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
## 2024-11-15 ENCOUNTER — HOME CARE VISIT (OUTPATIENT)
Dept: HOME HEALTH SERVICES | Facility: HOME HEALTHCARE | Age: 79
End: 2024-11-15
Payer: MEDICARE

## 2024-11-15 PROCEDURE — G0151 HHCP-SERV OF PT,EA 15 MIN: HCPCS

## 2024-11-15 NOTE — Clinical Note
Patient discharged from  PT services on 11/15/24 due to goals met, no longer homebound, transitioning to outpatient PT on 11/18.

## 2024-11-16 VITALS
DIASTOLIC BLOOD PRESSURE: 76 MMHG | SYSTOLIC BLOOD PRESSURE: 134 MMHG | HEART RATE: 72 BPM | OXYGEN SATURATION: 96 % | TEMPERATURE: 97.9 F

## 2024-11-16 VITALS
OXYGEN SATURATION: 97 % | SYSTOLIC BLOOD PRESSURE: 132 MMHG | HEART RATE: 72 BPM | DIASTOLIC BLOOD PRESSURE: 68 MMHG | RESPIRATION RATE: 18 BRPM | TEMPERATURE: 97.2 F

## 2024-11-16 NOTE — HOME HEALTH
"SUBJECTIVE:\"  The swelling was a little better when I woke up this morning.   No new med changes.   No recent Falls.   ASSESSMENT: remains limited by edema in  R LE however reduction over prior day. patient able to teach back importance of edema management and verbalizes compliance with strategies. ambulating with mod I and SPC . ready to transition to outpatient PT on 11/18  SKILL/EDUCATION PROVIDED: see interventions for details   PATIENT/CAREGIVER RESPONSE: see interventions for details   ____________     DATE OF NEXT APPOINTMENT WITH DOCTOR: Dr White 11/21/24"

## 2024-11-21 ENCOUNTER — TELEPHONE (OUTPATIENT)
Dept: ORTHOPEDIC SURGERY | Facility: CLINIC | Age: 79
End: 2024-11-21

## 2024-11-21 ENCOUNTER — OFFICE VISIT (OUTPATIENT)
Dept: ORTHOPEDIC SURGERY | Facility: CLINIC | Age: 79
End: 2024-11-21
Payer: MEDICARE

## 2024-11-21 VITALS — BODY MASS INDEX: 27.16 KG/M2 | HEIGHT: 71 IN | WEIGHT: 194 LBS

## 2024-11-21 DIAGNOSIS — Z96.651 STATUS POST TOTAL RIGHT KNEE REPLACEMENT: Primary | ICD-10-CM

## 2024-11-21 DIAGNOSIS — Z96.651 S/P TOTAL KNEE ARTHROPLASTY, RIGHT: ICD-10-CM

## 2024-11-21 RX ORDER — OXYCODONE AND ACETAMINOPHEN 5; 325 MG/1; MG/1
1-2 TABLET ORAL EVERY 4 HOURS PRN
Qty: 42 TABLET | Refills: 0 | Status: SHIPPED | OUTPATIENT
Start: 2024-11-21

## 2024-11-21 NOTE — TELEPHONE ENCOUNTER
Caller: MARTIN BLANCO    Relationship to patient: Emergency Contact    Best call back number:     Chief complaint: **Post R TKA, DOS 11/05/2024     Type of visit: POST-OP    Requested date: 12/19/24    Additional notes: NO AVAILABILITY AROUND 12/19/24

## 2024-11-21 NOTE — TELEPHONE ENCOUNTER
Caller: MARTIN BLANCO     Relationship to patient: Emergency Contact     Best call back number:      Chief complaint: **Post R TKA, DOS 11/05/2024      Type of visit: POST-OP     Requested date: 12/20/24     Additional notes: NO AVAILABILITY AROUND 12/19/24 THEY HAVE APPT FOR 1/2/25 THAT THEY WOULD LIKE TO MOVE UP

## 2024-11-21 NOTE — PROGRESS NOTES
CC: s/p right total knee arthroplasty, DOS 11/5/2024  Interval History: Cortez Delgado returns for 2 week postoperative visit.  He is doing well. Pain is controlled with pain medication and is  improving. He denies any wound problem, fevers, or chills. Patient is continuing to work with outpatient PT. Ambulating with cane. Continuing DVT prophylaxis with use of aspirin.     Physical Examination: Right knee was examined   Incision clean and dry   ROM 2-100,  4/5 strength   Stable to varus and valgus stress   Flex/extend ankle and toes   Positive sensation right foot   No calf pain, negative Homans sign bilaterally    Assessment/Plan:  Cortez Delgado is recovering from surgery as expected.  We will continue outpatient therapy for range of motion, strengthening, and gait normalization.    He is to follow up in clinic in 4 weeks with xrays. Patient had all question answered today. Will continue DVT prophylaxis for an additional 1-2 weeks. Discussed with patient to avoid immersing incision for 4 weeks total after surgery.     Medications:  No orders of the defined types were placed in this encounter.      Thanh White MD

## 2024-12-16 ENCOUNTER — OFFICE VISIT (OUTPATIENT)
Dept: ORTHOPEDIC SURGERY | Facility: CLINIC | Age: 79
End: 2024-12-16
Payer: MEDICARE

## 2024-12-16 VITALS — WEIGHT: 194 LBS | HEIGHT: 71 IN | BODY MASS INDEX: 27.16 KG/M2

## 2024-12-16 DIAGNOSIS — Z96.651 STATUS POST TOTAL RIGHT KNEE REPLACEMENT: Primary | ICD-10-CM

## 2024-12-16 DIAGNOSIS — M19.011 PRIMARY OSTEOARTHRITIS OF RIGHT SHOULDER: ICD-10-CM

## 2024-12-16 PROBLEM — M19.019 DJD OF SHOULDER: Status: ACTIVE | Noted: 2024-12-16

## 2024-12-16 PROCEDURE — 1159F MED LIST DOCD IN RCRD: CPT | Performed by: ORTHOPAEDIC SURGERY

## 2024-12-16 PROCEDURE — 99214 OFFICE O/P EST MOD 30 MIN: CPT | Performed by: ORTHOPAEDIC SURGERY

## 2024-12-16 PROCEDURE — 1160F RVW MEDS BY RX/DR IN RCRD: CPT | Performed by: ORTHOPAEDIC SURGERY

## 2024-12-16 PROCEDURE — 73562 X-RAY EXAM OF KNEE 3: CPT | Performed by: ORTHOPAEDIC SURGERY

## 2024-12-16 PROCEDURE — 99024 POSTOP FOLLOW-UP VISIT: CPT | Performed by: ORTHOPAEDIC SURGERY

## 2024-12-16 RX ORDER — ACETAMINOPHEN 325 MG/1
1000 TABLET ORAL ONCE
OUTPATIENT
Start: 2024-12-16 | End: 2024-12-16

## 2024-12-16 NOTE — PROGRESS NOTES
CC: s/p right total knee arthroplasty, DOS 11/5/2024  Interval History: Cortez Delgado returns for 6 week postoperative visit.  He is doing well. Pain is controlled with pain medication and is  improving. He denies any wound problem, fevers, or chills. Patient is continuing to work with outpatient PT. Ambulating without cane.     Physical Examination: Right knee was examined   Incision well healed   ROM 0-120,  4+/5 strength   Stable to varus and valgus stress   Flex/extend ankle and toes   Positive sensation right foot   No calf pain, negative Homans sign bilaterally    Radiographic review: Radiographs of the right knee 3 views, AP, lateral and patella axial views, compared to immediate postop films, indication s/p TKA,  shows that the implant is in good position. There is no evidence of implant loosening or osteolysis, no dislocation or periprosthetic fractures. Overall alignment acceptable at this time.     Assessment/Plan:  Cortez Delgado is recovering from surgery as expected.  We will continue outpatient therapy for range of motion, strengthening, and gait normalization. .    We also discussed his right shoulder today visit.  At this point in time is having significant exacerbation of pain with his right shoulder with limited abduction to 70 degrees.  He has failed home exercise which she has done for greater than 12 weeks.  His pain is occurring on a daily basis with significant associated crepitus and he has advanced degenerative changes to his right shoulder noted on plain film x-rays.  We discussed options and given limited improvement with the last injection of only 1 week, patient does wish to proceed with right reverse shoulder arthroplasty at this time.    Patient wishes to proceed with reverse shoulder arthroplasty at this point in time.  I discussed with with the patient the specific indication of a reverse shoulder arthroplasty particularly for shoulder pain as well as for pseudoparalysis, and  reviewed anatomy of the shoulder as well as a model of the implant.  I reviewed details of the procedure as well as risks, benefits, and alternatives with the risks including but not limited to neurovascular damage, bleeding, infection, periprosthetic fracture, chronic pain, instability, loosening of implant, failure of implant, loss of motion, weakness, stiffness, DVT, pulmonary embolus, death, stroke, complex regional pain syndrome, myocardial infarction, need for additional procedures.  Patient understood all these had all questions answered today.  We will have patient medically optimized by primary care physician and plan on proceeding with surgery at next available date.  Patient understood all this had all questions answered.  No guarantees were given in regards to results of the surgery.    CT scan ordered for preop planning    Patient denies history of DVT or pulmonary embolus, denies cardiac history, he is not diabetic.    Will plan for observation stay postoperatively.    Medications:  No orders of the defined types were placed in this encounter.      Thanh White MD

## 2025-01-14 ENCOUNTER — HOSPITAL ENCOUNTER (OUTPATIENT)
Dept: CT IMAGING | Facility: HOSPITAL | Age: 80
Discharge: HOME | End: 2025-01-14
Admitting: ORTHOPAEDIC SURGERY
Payer: MEDICARE

## 2025-01-14 DIAGNOSIS — M19.011 PRIMARY OSTEOARTHRITIS OF RIGHT SHOULDER: ICD-10-CM

## 2025-01-14 PROCEDURE — 73200 CT UPPER EXTREMITY W/O DYE: CPT

## 2025-02-04 ENCOUNTER — PRE-ADMISSION TESTING (OUTPATIENT)
Dept: PREADMISSION TESTING | Facility: HOSPITAL | Age: 80
End: 2025-02-04
Payer: MEDICARE

## 2025-02-04 VITALS
RESPIRATION RATE: 16 BRPM | BODY MASS INDEX: 27.44 KG/M2 | SYSTOLIC BLOOD PRESSURE: 128 MMHG | HEART RATE: 54 BPM | HEIGHT: 71 IN | WEIGHT: 196 LBS | OXYGEN SATURATION: 96 % | DIASTOLIC BLOOD PRESSURE: 62 MMHG

## 2025-02-04 DIAGNOSIS — M19.011 PRIMARY OSTEOARTHRITIS OF RIGHT SHOULDER: ICD-10-CM

## 2025-02-04 LAB
ABO GROUP BLD: NORMAL
ANION GAP SERPL CALCULATED.3IONS-SCNC: 8.3 MMOL/L (ref 5–15)
APTT PPP: 32.2 SECONDS (ref 24.3–38.1)
BASOPHILS # BLD AUTO: 0.02 10*3/MM3 (ref 0–0.2)
BASOPHILS NFR BLD AUTO: 0.4 % (ref 0–1.5)
BILIRUB UR QL STRIP: NEGATIVE
BLD GP AB SCN SERPL QL: NEGATIVE
BUN SERPL-MCNC: 16 MG/DL (ref 8–23)
BUN/CREAT SERPL: 15 (ref 7–25)
CALCIUM SPEC-SCNC: 9.5 MG/DL (ref 8.6–10.5)
CHLORIDE SERPL-SCNC: 106 MMOL/L (ref 98–107)
CLARITY UR: CLEAR
CO2 SERPL-SCNC: 26.7 MMOL/L (ref 22–29)
COLOR UR: YELLOW
CREAT SERPL-MCNC: 1.07 MG/DL (ref 0.76–1.27)
DEPRECATED RDW RBC AUTO: 44 FL (ref 37–54)
EGFRCR SERPLBLD CKD-EPI 2021: 70.2 ML/MIN/1.73
EOSINOPHIL # BLD AUTO: 0.12 10*3/MM3 (ref 0–0.4)
EOSINOPHIL NFR BLD AUTO: 2.4 % (ref 0.3–6.2)
ERYTHROCYTE [DISTWIDTH] IN BLOOD BY AUTOMATED COUNT: 12.5 % (ref 12.3–15.4)
GLUCOSE SERPL-MCNC: 82 MG/DL (ref 65–99)
GLUCOSE UR STRIP-MCNC: NEGATIVE MG/DL
HCT VFR BLD AUTO: 37.6 % (ref 37.5–51)
HGB BLD-MCNC: 12.2 G/DL (ref 13–17.7)
HGB UR QL STRIP.AUTO: NEGATIVE
IMM GRANULOCYTES # BLD AUTO: 0.01 10*3/MM3 (ref 0–0.05)
IMM GRANULOCYTES NFR BLD AUTO: 0.2 % (ref 0–0.5)
INR PPP: 1.27 (ref 0.9–1.1)
KETONES UR QL STRIP: NEGATIVE
LEUKOCYTE ESTERASE UR QL STRIP.AUTO: NEGATIVE
LYMPHOCYTES # BLD AUTO: 1.23 10*3/MM3 (ref 0.7–3.1)
LYMPHOCYTES NFR BLD AUTO: 25.1 % (ref 19.6–45.3)
MCH RBC QN AUTO: 31.4 PG (ref 26.6–33)
MCHC RBC AUTO-ENTMCNC: 32.4 G/DL (ref 31.5–35.7)
MCV RBC AUTO: 96.9 FL (ref 79–97)
MONOCYTES # BLD AUTO: 0.51 10*3/MM3 (ref 0.1–0.9)
MONOCYTES NFR BLD AUTO: 10.4 % (ref 5–12)
NEUTROPHILS NFR BLD AUTO: 3.02 10*3/MM3 (ref 1.7–7)
NEUTROPHILS NFR BLD AUTO: 61.5 % (ref 42.7–76)
NITRITE UR QL STRIP: NEGATIVE
PH UR STRIP.AUTO: 6 [PH] (ref 4.5–8)
PLATELET # BLD AUTO: 174 10*3/MM3 (ref 140–450)
PMV BLD AUTO: 10.3 FL (ref 6–12)
POTASSIUM SERPL-SCNC: 4.6 MMOL/L (ref 3.5–5.2)
PROT UR QL STRIP: NEGATIVE
PROTHROMBIN TIME: 16.4 SECONDS (ref 12.1–15)
RBC # BLD AUTO: 3.88 10*6/MM3 (ref 4.14–5.8)
RH BLD: NEGATIVE
SODIUM SERPL-SCNC: 141 MMOL/L (ref 136–145)
SP GR UR STRIP: 1.02 (ref 1–1.03)
T&S EXPIRATION DATE: NORMAL
UROBILINOGEN UR QL STRIP: NORMAL
WBC NRBC COR # BLD AUTO: 4.91 10*3/MM3 (ref 3.4–10.8)

## 2025-02-04 PROCEDURE — 86850 RBC ANTIBODY SCREEN: CPT

## 2025-02-04 PROCEDURE — 86901 BLOOD TYPING SEROLOGIC RH(D): CPT

## 2025-02-04 PROCEDURE — 85610 PROTHROMBIN TIME: CPT

## 2025-02-04 PROCEDURE — 85025 COMPLETE CBC W/AUTO DIFF WBC: CPT

## 2025-02-04 PROCEDURE — 81003 URINALYSIS AUTO W/O SCOPE: CPT

## 2025-02-04 PROCEDURE — 80048 BASIC METABOLIC PNL TOTAL CA: CPT

## 2025-02-04 PROCEDURE — 85730 THROMBOPLASTIN TIME PARTIAL: CPT

## 2025-02-04 PROCEDURE — 87081 CULTURE SCREEN ONLY: CPT

## 2025-02-04 PROCEDURE — 36415 COLL VENOUS BLD VENIPUNCTURE: CPT

## 2025-02-04 PROCEDURE — 86900 BLOOD TYPING SEROLOGIC ABO: CPT

## 2025-02-04 NOTE — PAT
Pt here for PAT visit.  Pre-op tests completed, chg soap given, and instructions reviewed.  Instructed clears until 2 hrs prior to arrival time, voiced understanding. ERAS reviewed with pt, Benzoly peroxide handout given and instructions reviewed

## 2025-02-04 NOTE — DISCHARGE INSTRUCTIONS
PRE-ADMISSION TESTING INSTRUCTIONS FOR TOTAL JOINT PATIENTS    Take these medications the morning of surgery with a small sip of water: metoprolol      No aspirin, advil, aleve, ibuprofen, naproxen, diet pills, decongestants, or vitamin/herbal supplements for a week prior to your surgery.     Tylenol/Acetaminophen ok to take if needed.    Do not take any insulin or diabetes medications the morning of surgery.          General Instructions:    DO NOT EAT SOLID FOOD AFTER MIDNIGHT THE NIGHT BEFORE SURGERY. No gum, mints, or hard candy after midnight the night before surgery.  You may drink clear liquids the day of surgery up until 2 hours before your arrival time.  Clear liquids are liquids you can see through. Nothing RED in color.    Plain water    Sports drinks      Gelatin (Jell-O)  Fruit juices without pulp such as white grape juice and apple juice  Popsicles that contain no fruit or yogurt  Tea or coffee (no cream or milk added)    It is beneficial for you to have a clear drink that contains carbohydrates 2 hours prior to your arrival time.  DRINK A BOTTLE OF SPORTS DRINK 2 HOURS BEFORE YOUR ARRIVAL TIME. IF YOU ARE DIABETIC, DRINK A LOW OR NO SUGAR SPORTS DRINK. ANY COLOR EXCEPT RED.    Patients who avoid smoking, chewing tobacco and alcohol for 4 weeks prior to surgery have a reduced risk of post-operative complications.  If at all possible, quit smoking as many days before surgery as you can.    Do not smoke, use chewing tobacco or drink alcohol after midnight the day of surgery.    Bring your C-PAP/ BI-PAP machine if you use one.  Wear clean comfortable clothes.  Do not wear contact lenses, lotion, deodorant, or make-up.  Bring a case for your glasses if applicable.   You may brush your teeth the morning of surgery.  You may wear dentures/partials, do not put adhesive/glue on them.  Leave all other jewelry and valuables at home.  NOTIFY YOUR SURGEON IF YOU BECOME ILL, HAVE A FEVER, PRODUCTIVE COUGH, OR  CANNOT BE HERE THE DAY OF SURGERY.      Preventing a Surgical Site Infection:    Shower the night before and on the morning of surgery using the chlorhexidine soap you were given.  Use a clean washcloth with the soap.  Place clean sheets on your bed after showering the night before surgery. Do not use the CHG soap on your hair, face, or private areas. Wash your body gently for five (5) minutes. Do not scrub your skin.  Dry with a clean towel and dress in clean clothing.    Do not shave the surgical area for 10 days-2 weeks prior to surgery  because the razor can irritate skin and make it easier to develop an infection.  Make sure you, your family, and all healthcare providers clean their hands with soap and water or an alcohol based hand  before caring for you or your wound.      Day of surgery:    Your surgeon’s office will advise you of your arrival time for the day of surgery.    Upon arrival, a Pre-op nurse and Anesthesia provider will review your health history, obtain vital signs, and answer questions you may have. The anesthesia provider will also discuss the type of anesthesia that will be needed for your procedure, which may include general anesthesia. The only belongings needed at this time will be your home medications and if applicable your C-PAP/BI-PAP machine.  If you are staying overnight your family can leave the rest of your belongings in the car and bring them to your room later.  A Pre-op nurse will start an IV and you may receive medication in preparation for surgery, including something to help you relax.  Your family will be able to see you in the Pre-op area.  While you are in surgery your family should notify the waiting room  if they leave the waiting room area and provide a contact phone number.    If you have any questions, you can call the Pre-Admission Department at [phone] or your surgeon's office.    Please be aware that surgery does come with discomfort.   We want to make every effort to control your discomfort so please discuss any uncontrolled symptoms with your nurse.   Your doctor will most likely have prescribed pain medications.     You may have bruising or discomfort from the tourniquet used in surgery.     Please leave all luggage in the car the morning of surgery.  You will be transported to your hospital room following the recovery period.  Your family can get your luggage at that time.      You may receive a survey regarding the care you received. Your feedback is very important and will be used to collect the necessary data to help us to continue to provide excellent care.

## 2025-02-05 LAB — MRSA SPEC QL CULT: NORMAL

## 2025-02-13 ENCOUNTER — ANESTHESIA EVENT (OUTPATIENT)
Dept: PERIOP | Facility: HOSPITAL | Age: 80
End: 2025-02-13
Payer: MEDICARE

## 2025-02-13 ENCOUNTER — OFFICE VISIT (OUTPATIENT)
Dept: ORTHOPEDIC SURGERY | Facility: CLINIC | Age: 80
End: 2025-02-13
Payer: MEDICARE

## 2025-02-13 VITALS — BODY MASS INDEX: 27.55 KG/M2 | HEIGHT: 71 IN | WEIGHT: 196.8 LBS

## 2025-02-13 DIAGNOSIS — M19.011 PRIMARY OSTEOARTHRITIS OF RIGHT SHOULDER: Primary | ICD-10-CM

## 2025-02-13 RX ORDER — ALLOPURINOL 100 MG/1
100 TABLET ORAL DAILY
COMMUNITY
Start: 2025-02-10

## 2025-02-13 NOTE — PROGRESS NOTES
CC: f/u right shoulder pain, DJD     Cortez Delgado presented to clinic today for preoperative history and physical visit in anticipation of upcoming scheduled right reverse total shoulder arthroplasty.     Discussed treatment options at length with patient today in regards to right shoulder. He has failed conservative treatment at this point in time and would like to proceed with shoulder arthroplasty. I discussed with the patient the specific indication of a reverse shoulder arthroplasty particularly for shoulder pain as well as for pseudoparalysis, and reviewed anatomy of the shoulder as well as a model of the implant. I reviewed details of the procedure as well as risks, benefits, and alternatives with the risks including but not limited to neurovascular damage, bleeding, infection, periprosthetic fracture, chronic pain, instability, loosening of implant, failure of implant, loss of motion, weakness, stiffness, DVT, pulmonary embolus, death, stroke, complex regional pain syndrome, myocardial infarction, need for additional procedures. Patient understood all these and had all questions answered today. No guarantees given in regards to results from the surgery.     Postoperative DVT prophylaxis will be with aspirin.  Patient does not have a history of DVT or pulmonary embolus.     All remaining questions answered today.

## 2025-02-13 NOTE — H&P
History & Physical       Patient: Cortez Delgado    YOB: 1945    Medical Record Number: [id number]    Surgeon:  Dr. Thanh White    Chief Complaints:   Chief Complaint   Patient presents with    Right Shoulder - Pre-op Exam         History of Present Illness: 79 y.o. male presents with   Chief Complaint   Patient presents with    Right Shoulder - Pre-op Exam   . Onset of symptoms was years ago and has been progressively worsening despite more conservative treatment measures.  Symptoms are associated with ability to move, lift, push, pull, and perform activities of daily living with affected extremity. Symptoms are aggravated by overhead motion, lifting, and pushing as well as ROM necessary for activities of daily living.   Symptoms improve with rest, ice and elevation only minimally.      Allergies:   Allergies   Allergen Reactions    Penicillins Other (See Comments) and Rash     Please ask patient reaction       Medications:   Home Medications:  Current Outpatient Medications on File Prior to Visit   Medication Sig    aspirin 81 MG EC tablet Take 1 tablet by mouth 2 (Two) Times a Day. (Patient taking differently: Take 1 tablet by mouth 2 (Two) Times a Day.)    metoprolol succinate XL (TOPROL-XL) 50 MG 24 hr tablet Take 1 tablet by mouth Daily. Indications: High Blood Pressure    rosuvastatin (CRESTOR) 5 MG tablet Take 1 tablet by mouth Daily. Indications: High Amount of Fats in the Blood    valsartan (DIOVAN) 80 MG tablet Take 1 tablet by mouth Daily. Indications: High Blood Pressure    allopurinol (ZYLOPRIM) 100 MG tablet Take 1 tablet by mouth Daily. (Patient not taking: Reported on 2/13/2025)    naloxone (NARCAN) 4 MG/0.1ML nasal spray Call 911. Don't prime. [place] in 1 nostril for overdose. Repeat in 2-3 minutes in other nostril if no or minimal breathing/responsiveness. (Patient not taking: Reported on 12/16/2024)    NIACIN CR PO Take 500 mg by mouth Daily. Indications: unknown (Patient  not taking: Reported on 2/13/2025)    oxyCODONE-acetaminophen (PERCOCET) 5-325 MG per tablet Take 1-2 tablets by mouth Every 4 (Four) Hours As Needed (Pain). Indications: Pain (Patient not taking: Reported on 12/16/2024)    Pediatric Multivitamins-Iron (FLINTSTONES W/IRON PO) Take 1 dose by mouth Daily. Indications: dietary supplement (Patient not taking: Reported on 2/13/2025)    vitamin B-12 (CYANOCOBALAMIN) 1000 MCG tablet Take 1 tablet by mouth Daily. Indications: Inadequate Vitamin B12 (Patient not taking: Reported on 2/13/2025)    vitamin D3 125 MCG (5000 UT) capsule capsule Take 1 capsule by mouth Daily. Indications: Vitamin D Deficiency (Patient not taking: Reported on 2/13/2025)     No current facility-administered medications on file prior to visit.     Current Medications:  Scheduled Meds:  Continuous Infusions:No current facility-administered medications for this visit.    PRN Meds:.    I have reviewed the patient's medical history in detail and updated the computerized patient record.  Review and summarization of old records include:    Past Medical History:   Diagnosis Date    Arthritis     Colon polyps     Fracture of wrist     Approx 12 yrs ago    Frozen shoulder     Hyperlipidemia     Hypertension     Knee swelling     Periarthritis of shoulder     Sleep apnea     cpap    Tendinitis of knee         Past Surgical History:   Procedure Laterality Date    COLONOSCOPY      COLONOSCOPY N/A 10/30/2023    Procedure: COLONOSCOPY TO CECUM;  Surgeon: David Ryder MD;  Location: [facility] [place] MAIN OR;  Service: Gastroenterology;  Laterality: N/A;  diverticulosis, polyps,hemorrhoids    COLONOSCOPY W/ POLYPECTOMY N/A 04/20/2023    Procedure: COLONOSCOPY WITH POLYPECTOMY;  Surgeon: David Ryder MD;  Location: [facility] MAIN OR;  Service: Gastroenterology;  Laterality: N/A;  DIVERTICULOSIS, POLYPS X4, HEMORRHOIDS    FRACTURE SURGERY      KNEE SURGERY      TOTAL KNEE ARTHROPLASTY Right 11/05/2024    Procedure:  total knee arthroplasty and all associated procedures;  Surgeon: Thanh White MD;  Location: [facility];  Service: Robotics - Ortho;  Laterality: Right;    WRIST SURGERY Left     Approx 12 yrs ago        Social History     Occupational History    Not on file   Tobacco Use    Smoking status: Never     Passive exposure: Never    Smokeless tobacco: Never   Vaping Use    Vaping status: Never Used   Substance and Sexual Activity    Alcohol use: Never    Drug use: Never    Sexual activity: Never      Social History     Social History Narrative    Not on file      No family history on file.    ROS: 14 point review of systems was performed and was negative except for documented findings in HPI and today's encounter.     Allergies:   Allergies   Allergen Reactions    Penicillins Other (See Comments) and Rash     Please ask patient reaction     Constitutional:  Denies fever, shaking or chills   Eyes:  Denies change in visual acuity   HENT:  Denies nasal congestion or sore throat   Respiratory:  Denies cough or shortness of breath   Cardiovascular:  Denies chest pain or severe LE edema   GI:  Denies abdominal pain, nausea, vomiting, bloody stools or diarrhea   Musculoskeletal:  Denies numbness tingling or loss of motor function except as outlined above in history of present illness.  : Denies painful urination or hematuria  Integument:  Denies rash, lesion or ulceration   Neurologic:  Denies headache or focal weakness  Endocrine:  Denies lymphadenopathy  Psych:  Denies confusion or change in mental status   Hem:  Denies active bleeding    Physical Exam: 79 y.o. male  Body mass index is 27.46 kg/m².  There were no vitals filed for this visit.  Vital signs reviewed.   General Appearance:    Alert, cooperative, in no acute distress                  Eyes: conjunctiva clear  ENT: external ears and nose atraumatic  CV: no peripheral edema  Resp: normal respiratory effort  Skin: no rashes or wounds; normal turgor  Psych: mood  and affect appropriate  Lymph: no nodes appreciated  Neuro: gross sensation intact  Vascular:  Palpable peripheral pulse in noted extremity  Musculoskeletal Extremities: Right shoulder-maximal tenderness palpation anterior posterior glenohumeral joint with significant associated crepitus.  Positive deltoid firing all 3 components.  Active and passive forward flexion 80 degrees, active and passive external rotation 10 degrees.     Debilities/Disabilities Identified: None      Diagnostic Tests:  Pre-Admission Testing on 02/04/2025   Component Date Value Ref Range Status    ABO Type 02/04/2025 O   Final    RH type 02/04/2025 Negative   Final    Antibody Screen 02/04/2025 Negative   Final    T&S Expiration Date 02/04/2025 2/18/2025 11:59:00 PM   Final    Glucose 02/04/2025 82  65 - 99 mg/dL Final    BUN 02/04/2025 16  8 - 23 mg/dL Final    Creatinine 02/04/2025 1.07  0.76 - 1.27 mg/dL Final    Sodium 02/04/2025 141  136 - 145 mmol/L Final    Potassium 02/04/2025 4.6  3.5 - 5.2 mmol/L Final    Chloride 02/04/2025 106  98 - 107 mmol/L Final    CO2 02/04/2025 26.7  22.0 - 29.0 mmol/L Final    Calcium 02/04/2025 9.5  8.6 - 10.5 mg/dL Final    BUN/Creatinine Ratio 02/04/2025 15.0  7.0 - 25.0 Final    Anion Gap 02/04/2025 8.3  5.0 - 15.0 mmol/L Final    eGFR 02/04/2025 70.2  >60.0 mL/min/1.73 Final    Protime 02/04/2025 16.4 (H)  12.1 - 15.0 Seconds Final    INR 02/04/2025 1.27 (H)  0.90 - 1.10 Final    PTT 02/04/2025 32.2  24.3 - 38.1 seconds Final    MRSA Screen Cx 02/04/2025 No Methicillin Resistant Staphylococcus aureus isolated   Final    Color, UA 02/04/2025 Yellow  Yellow, Straw Final    Appearance, UA 02/04/2025 Clear  Clear Final    pH, UA 02/04/2025 6.0  4.5 - 8.0 Final    Specific Gravity, UA 02/04/2025 1.020  1.003 - 1.030 Final    Glucose, UA 02/04/2025 Negative  Negative Final    Ketones, UA 02/04/2025 Negative  Negative Final    Bilirubin, UA 02/04/2025 Negative  Negative Final    Blood, UA 02/04/2025 Negative   Negative Final    Protein, UA 02/04/2025 Negative  Negative Final    Leuk Esterase, UA 02/04/2025 Negative  Negative Final    Nitrite, UA 02/04/2025 Negative  Negative Final    Urobilinogen, UA 02/04/2025 0.2 E.U./dL  0.2 - 1.0 E.U./dL Final    WBC 02/04/2025 4.91  3.40 - 10.80 10*3/mm3 Final    RBC 02/04/2025 3.88 (L)  4.14 - 5.80 10*6/mm3 Final    Hemoglobin 02/04/2025 12.2 (L)  13.0 - 17.7 g/dL Final    Hematocrit 02/04/2025 37.6  37.5 - 51.0 % Final    MCV 02/04/2025 96.9  79.0 - 97.0 fL Final    MCH 02/04/2025 31.4  26.6 - 33.0 pg Final    MCHC 02/04/2025 32.4  31.5 - 35.7 g/dL Final    RDW 02/04/2025 12.5  12.3 - 15.4 % Final    RDW-SD 02/04/2025 44.0  37.0 - 54.0 fl Final    MPV 02/04/2025 10.3  6.0 - 12.0 fL Final    Platelets 02/04/2025 174  140 - 450 10*3/mm3 Final    Neutrophil % 02/04/2025 61.5  42.7 - 76.0 % Final    Lymphocyte % 02/04/2025 25.1  19.6 - 45.3 % Final    Monocyte % 02/04/2025 10.4  5.0 - 12.0 % Final    Eosinophil % 02/04/2025 2.4  0.3 - 6.2 % Final    Basophil % 02/04/2025 0.4  0.0 - 1.5 % Final    Immature Grans % 02/04/2025 0.2  0.0 - 0.5 % Final    Neutrophils, Absolute 02/04/2025 3.02  1.70 - 7.00 10*3/mm3 Final    Lymphocytes, Absolute 02/04/2025 1.23  0.70 - 3.10 10*3/mm3 Final    Monocytes, Absolute 02/04/2025 0.51  0.10 - 0.90 10*3/mm3 Final    Eosinophils, Absolute 02/04/2025 0.12  0.00 - 0.40 10*3/mm3 Final    Basophils, Absolute 02/04/2025 0.02  0.00 - 0.20 10*3/mm3 Final    Immature Grans, Absolute 02/04/2025 0.01  0.00 - 0.05 10*3/mm3 Final     No results found.    Right Shoulder X-Ray  Indication: Pain  AP, scapular Y, and axillary lateral views    Findings:  No fracture  No bony lesion  Normal soft tissues  Advanced rotator cuff arthropathy with significant bone-on-bone articulation and osteophyte formation.  Compared to prior office x-rays      Assessment:  Right shoulder DJD    Plan: Patient wishes to proceed with reverse shoulder arthroplasty at this point in time.  I  discussed with with the patient the specific indication of a reverse shoulder arthroplasty particularly for shoulder pain as well as for pseudoparalysis, and reviewed anatomy of the shoulder as well as a model of the implant.  I reviewed details of the procedure as well as risks, benefits, and alternatives with the risks including but not limited to neurovascular damage, bleeding, infection, periprosthetic fracture, chronic pain, instability, loosening of implant, failure of implant, loss of motion, weakness, stiffness, DVT, pulmonary embolus, death, stroke, complex regional pain syndrome, myocardial infarction, need for additional procedures.  Patient understood all these had all questions answered today prior to consenting to proceed with surgery.  Patient has been medically optimized by primary care physician. No guarantees were given in regards to results of the surgery.      Cortez Delgado was given the opportunity to ask and have all questions answered today.  The patient voiced understanding of the risks, benefits, and alternative forms of treatment that were discussed and the patient consents to proceed with surgery.     Patient's blood clot history is negative.    Plan for DVT prophylaxis is aspirin    Patient's MRSA infection history is negative.    Patient's skin infection history is negative.    Discharge Plan: POD 1-2 to home    Date: 2/13/2025  Thanh White MD      Dictated utilizing Dragon dictation

## 2025-02-13 NOTE — H&P (VIEW-ONLY)
History & Physical       Patient: Cortez Delgado    YOB: 1945    Medical Record Number: [id number]    Surgeon:  Dr. Thanh White    Chief Complaints:   Chief Complaint   Patient presents with    Right Shoulder - Pre-op Exam         History of Present Illness: 79 y.o. male presents with   Chief Complaint   Patient presents with    Right Shoulder - Pre-op Exam   . Onset of symptoms was years ago and has been progressively worsening despite more conservative treatment measures.  Symptoms are associated with ability to move, lift, push, pull, and perform activities of daily living with affected extremity. Symptoms are aggravated by overhead motion, lifting, and pushing as well as ROM necessary for activities of daily living.   Symptoms improve with rest, ice and elevation only minimally.      Allergies:   Allergies   Allergen Reactions    Penicillins Other (See Comments) and Rash     Please ask patient reaction       Medications:   Home Medications:  Current Outpatient Medications on File Prior to Visit   Medication Sig    aspirin 81 MG EC tablet Take 1 tablet by mouth 2 (Two) Times a Day. (Patient taking differently: Take 1 tablet by mouth 2 (Two) Times a Day.)    metoprolol succinate XL (TOPROL-XL) 50 MG 24 hr tablet Take 1 tablet by mouth Daily. Indications: High Blood Pressure    rosuvastatin (CRESTOR) 5 MG tablet Take 1 tablet by mouth Daily. Indications: High Amount of Fats in the Blood    valsartan (DIOVAN) 80 MG tablet Take 1 tablet by mouth Daily. Indications: High Blood Pressure    allopurinol (ZYLOPRIM) 100 MG tablet Take 1 tablet by mouth Daily. (Patient not taking: Reported on 2/13/2025)    naloxone (NARCAN) 4 MG/0.1ML nasal spray Call 911. Don't prime. [place] in 1 nostril for overdose. Repeat in 2-3 minutes in other nostril if no or minimal breathing/responsiveness. (Patient not taking: Reported on 12/16/2024)    NIACIN CR PO Take 500 mg by mouth Daily. Indications: unknown (Patient  not taking: Reported on 2/13/2025)    oxyCODONE-acetaminophen (PERCOCET) 5-325 MG per tablet Take 1-2 tablets by mouth Every 4 (Four) Hours As Needed (Pain). Indications: Pain (Patient not taking: Reported on 12/16/2024)    Pediatric Multivitamins-Iron (FLINTSTONES W/IRON PO) Take 1 dose by mouth Daily. Indications: dietary supplement (Patient not taking: Reported on 2/13/2025)    vitamin B-12 (CYANOCOBALAMIN) 1000 MCG tablet Take 1 tablet by mouth Daily. Indications: Inadequate Vitamin B12 (Patient not taking: Reported on 2/13/2025)    vitamin D3 125 MCG (5000 UT) capsule capsule Take 1 capsule by mouth Daily. Indications: Vitamin D Deficiency (Patient not taking: Reported on 2/13/2025)     No current facility-administered medications on file prior to visit.     Current Medications:  Scheduled Meds:  Continuous Infusions:No current facility-administered medications for this visit.    PRN Meds:.    I have reviewed the patient's medical history in detail and updated the computerized patient record.  Review and summarization of old records include:    Past Medical History:   Diagnosis Date    Arthritis     Colon polyps     Fracture of wrist     Approx 12 yrs ago    Frozen shoulder     Hyperlipidemia     Hypertension     Knee swelling     Periarthritis of shoulder     Sleep apnea     cpap    Tendinitis of knee         Past Surgical History:   Procedure Laterality Date    COLONOSCOPY      COLONOSCOPY N/A 10/30/2023    Procedure: COLONOSCOPY TO CECUM;  Surgeon: David Ryder MD;  Location: [facility] MAIN OR;  Service: Gastroenterology;  Laterality: N/A;  diverticulosis, polyps,hemorrhoids    COLONOSCOPY W/ POLYPECTOMY N/A 04/20/2023    Procedure: COLONOSCOPY WITH POLYPECTOMY;  Surgeon: David Ryder MD;  Location: [facility] MAIN OR;  Service: Gastroenterology;  Laterality: N/A;  DIVERTICULOSIS, POLYPS X4, HEMORRHOIDS    FRACTURE SURGERY      KNEE SURGERY      TOTAL KNEE ARTHROPLASTY Right 11/05/2024    Procedure:  total knee arthroplasty and all associated procedures;  Surgeon: Thanh White MD;  Location: [facility];  Service: Robotics - Ortho;  Laterality: Right;    WRIST SURGERY Left     Approx 12 yrs ago        Social History     Occupational History    Not on file   Tobacco Use    Smoking status: Never     Passive exposure: Never    Smokeless tobacco: Never   Vaping Use    Vaping status: Never Used   Substance and Sexual Activity    Alcohol use: Never    Drug use: Never    Sexual activity: Never      Social History     Social History Narrative    Not on file      No family history on file.    ROS: 14 point review of systems was performed and was negative except for documented findings in HPI and today's encounter.     Allergies:   Allergies   Allergen Reactions    Penicillins Other (See Comments) and Rash     Please ask patient reaction     Constitutional:  Denies fever, shaking or chills   Eyes:  Denies change in visual acuity   HENT:  Denies nasal congestion or sore throat   Respiratory:  Denies cough or shortness of breath   Cardiovascular:  Denies chest pain or severe LE edema   GI:  Denies abdominal pain, nausea, vomiting, bloody stools or diarrhea   Musculoskeletal:  Denies numbness tingling or loss of motor function except as outlined above in history of present illness.  : Denies painful urination or hematuria  Integument:  Denies rash, lesion or ulceration   Neurologic:  Denies headache or focal weakness  Endocrine:  Denies lymphadenopathy  Psych:  Denies confusion or change in mental status   Hem:  Denies active bleeding    Physical Exam: 79 y.o. male  Body mass index is 27.46 kg/m².  There were no vitals filed for this visit.  Vital signs reviewed.   General Appearance:    Alert, cooperative, in no acute distress                  Eyes: conjunctiva clear  ENT: external ears and nose atraumatic  CV: no peripheral edema  Resp: normal respiratory effort  Skin: no rashes or wounds; normal turgor  Psych: mood  and affect appropriate  Lymph: no nodes appreciated  Neuro: gross sensation intact  Vascular:  Palpable peripheral pulse in noted extremity  Musculoskeletal Extremities: Right shoulder-maximal tenderness palpation anterior posterior glenohumeral joint with significant associated crepitus.  Positive deltoid firing all 3 components.  Active and passive forward flexion 80 degrees, active and passive external rotation 10 degrees.     Debilities/Disabilities Identified: None      Diagnostic Tests:  Pre-Admission Testing on 02/04/2025   Component Date Value Ref Range Status    ABO Type 02/04/2025 O   Final    RH type 02/04/2025 Negative   Final    Antibody Screen 02/04/2025 Negative   Final    T&S Expiration Date 02/04/2025 2/18/2025 11:59:00 PM   Final    Glucose 02/04/2025 82  65 - 99 mg/dL Final    BUN 02/04/2025 16  8 - 23 mg/dL Final    Creatinine 02/04/2025 1.07  0.76 - 1.27 mg/dL Final    Sodium 02/04/2025 141  136 - 145 mmol/L Final    Potassium 02/04/2025 4.6  3.5 - 5.2 mmol/L Final    Chloride 02/04/2025 106  98 - 107 mmol/L Final    CO2 02/04/2025 26.7  22.0 - 29.0 mmol/L Final    Calcium 02/04/2025 9.5  8.6 - 10.5 mg/dL Final    BUN/Creatinine Ratio 02/04/2025 15.0  7.0 - 25.0 Final    Anion Gap 02/04/2025 8.3  5.0 - 15.0 mmol/L Final    eGFR 02/04/2025 70.2  >60.0 mL/min/1.73 Final    Protime 02/04/2025 16.4 (H)  12.1 - 15.0 Seconds Final    INR 02/04/2025 1.27 (H)  0.90 - 1.10 Final    PTT 02/04/2025 32.2  24.3 - 38.1 seconds Final    MRSA Screen Cx 02/04/2025 No Methicillin Resistant Staphylococcus aureus isolated   Final    Color, UA 02/04/2025 Yellow  Yellow, Straw Final    Appearance, UA 02/04/2025 Clear  Clear Final    pH, UA 02/04/2025 6.0  4.5 - 8.0 Final    Specific Gravity, UA 02/04/2025 1.020  1.003 - 1.030 Final    Glucose, UA 02/04/2025 Negative  Negative Final    Ketones, UA 02/04/2025 Negative  Negative Final    Bilirubin, UA 02/04/2025 Negative  Negative Final    Blood, UA 02/04/2025 Negative   Negative Final    Protein, UA 02/04/2025 Negative  Negative Final    Leuk Esterase, UA 02/04/2025 Negative  Negative Final    Nitrite, UA 02/04/2025 Negative  Negative Final    Urobilinogen, UA 02/04/2025 0.2 E.U./dL  0.2 - 1.0 E.U./dL Final    WBC 02/04/2025 4.91  3.40 - 10.80 10*3/mm3 Final    RBC 02/04/2025 3.88 (L)  4.14 - 5.80 10*6/mm3 Final    Hemoglobin 02/04/2025 12.2 (L)  13.0 - 17.7 g/dL Final    Hematocrit 02/04/2025 37.6  37.5 - 51.0 % Final    MCV 02/04/2025 96.9  79.0 - 97.0 fL Final    MCH 02/04/2025 31.4  26.6 - 33.0 pg Final    MCHC 02/04/2025 32.4  31.5 - 35.7 g/dL Final    RDW 02/04/2025 12.5  12.3 - 15.4 % Final    RDW-SD 02/04/2025 44.0  37.0 - 54.0 fl Final    MPV 02/04/2025 10.3  6.0 - 12.0 fL Final    Platelets 02/04/2025 174  140 - 450 10*3/mm3 Final    Neutrophil % 02/04/2025 61.5  42.7 - 76.0 % Final    Lymphocyte % 02/04/2025 25.1  19.6 - 45.3 % Final    Monocyte % 02/04/2025 10.4  5.0 - 12.0 % Final    Eosinophil % 02/04/2025 2.4  0.3 - 6.2 % Final    Basophil % 02/04/2025 0.4  0.0 - 1.5 % Final    Immature Grans % 02/04/2025 0.2  0.0 - 0.5 % Final    Neutrophils, Absolute 02/04/2025 3.02  1.70 - 7.00 10*3/mm3 Final    Lymphocytes, Absolute 02/04/2025 1.23  0.70 - 3.10 10*3/mm3 Final    Monocytes, Absolute 02/04/2025 0.51  0.10 - 0.90 10*3/mm3 Final    Eosinophils, Absolute 02/04/2025 0.12  0.00 - 0.40 10*3/mm3 Final    Basophils, Absolute 02/04/2025 0.02  0.00 - 0.20 10*3/mm3 Final    Immature Grans, Absolute 02/04/2025 0.01  0.00 - 0.05 10*3/mm3 Final     No results found.    Right Shoulder X-Ray  Indication: Pain  AP, scapular Y, and axillary lateral views    Findings:  No fracture  No bony lesion  Normal soft tissues  Advanced rotator cuff arthropathy with significant bone-on-bone articulation and osteophyte formation.  Compared to prior office x-rays      Assessment:  Right shoulder DJD    Plan: Patient wishes to proceed with reverse shoulder arthroplasty at this point in time.  I  discussed with with the patient the specific indication of a reverse shoulder arthroplasty particularly for shoulder pain as well as for pseudoparalysis, and reviewed anatomy of the shoulder as well as a model of the implant.  I reviewed details of the procedure as well as risks, benefits, and alternatives with the risks including but not limited to neurovascular damage, bleeding, infection, periprosthetic fracture, chronic pain, instability, loosening of implant, failure of implant, loss of motion, weakness, stiffness, DVT, pulmonary embolus, death, stroke, complex regional pain syndrome, myocardial infarction, need for additional procedures.  Patient understood all these had all questions answered today prior to consenting to proceed with surgery.  Patient has been medically optimized by primary care physician. No guarantees were given in regards to results of the surgery.      Cortez Delgado was given the opportunity to ask and have all questions answered today.  The patient voiced understanding of the risks, benefits, and alternative forms of treatment that were discussed and the patient consents to proceed with surgery.     Patient's blood clot history is negative.    Plan for DVT prophylaxis is aspirin    Patient's MRSA infection history is negative.    Patient's skin infection history is negative.    Discharge Plan: POD 1-2 to home    Date: 2/13/2025  Thanh White MD      Dictated utilizing Dragon dictation

## 2025-02-14 ENCOUNTER — APPOINTMENT (OUTPATIENT)
Dept: GENERAL RADIOLOGY | Facility: HOSPITAL | Age: 80
End: 2025-02-14
Payer: MEDICARE

## 2025-02-14 ENCOUNTER — ANESTHESIA (OUTPATIENT)
Dept: PERIOP | Facility: HOSPITAL | Age: 80
End: 2025-02-14
Payer: MEDICARE

## 2025-02-14 ENCOUNTER — HOSPITAL ENCOUNTER (OUTPATIENT)
Facility: HOSPITAL | Age: 80
Setting detail: OBSERVATION
Discharge: HOME | End: 2025-02-15
Attending: ORTHOPAEDIC SURGERY | Admitting: ORTHOPAEDIC SURGERY
Payer: MEDICARE

## 2025-02-14 DIAGNOSIS — Z96.611 S/P REVERSE TOTAL SHOULDER ARTHROPLASTY, RIGHT: Primary | ICD-10-CM

## 2025-02-14 DIAGNOSIS — M19.011 PRIMARY OSTEOARTHRITIS OF RIGHT SHOULDER: ICD-10-CM

## 2025-02-14 LAB
QT INTERVAL: 448 MS
QTC INTERVAL: 428 MS

## 2025-02-14 PROCEDURE — 73020 X-RAY EXAM OF SHOULDER: CPT

## 2025-02-14 PROCEDURE — C1713 ANCHOR/SCREW BN/BN,TIS/BN: HCPCS | Performed by: ORTHOPAEDIC SURGERY

## 2025-02-14 PROCEDURE — 25010000002 ONDANSETRON PER 1 MG

## 2025-02-14 PROCEDURE — C1776 JOINT DEVICE (IMPLANTABLE): HCPCS | Performed by: ORTHOPAEDIC SURGERY

## 2025-02-14 PROCEDURE — 63710000001 ROSUVASTATIN 5 MG TABLET: Performed by: ORTHOPAEDIC SURGERY

## 2025-02-14 PROCEDURE — 25010000002 BUPIVACAINE LIPOSOME 1.3 % SUSPENSION: Performed by: ORTHOPAEDIC SURGERY

## 2025-02-14 PROCEDURE — 25010000002 PHENYLEPHRINE 10 MG/ML SOLUTION

## 2025-02-14 PROCEDURE — 25010000002 BUPIVACAINE (PF) 0.5 % SOLUTION

## 2025-02-14 PROCEDURE — 25010000002 SUGAMMADEX 200 MG/2ML SOLUTION

## 2025-02-14 PROCEDURE — 93005 ELECTROCARDIOGRAM TRACING: CPT

## 2025-02-14 PROCEDURE — 25010000002 CEFAZOLIN PER 500 MG: Performed by: ORTHOPAEDIC SURGERY

## 2025-02-14 PROCEDURE — 63710000001 TAMSULOSIN 0.4 MG CAPSULE: Performed by: ORTHOPAEDIC SURGERY

## 2025-02-14 PROCEDURE — G0378 HOSPITAL OBSERVATION PER HR: HCPCS

## 2025-02-14 PROCEDURE — 25810000003 LACTATED RINGERS PER 1000 ML

## 2025-02-14 PROCEDURE — 25010000002 FENTANYL CITRATE (PF) 50 MCG/ML SOLUTION

## 2025-02-14 PROCEDURE — A9270 NON-COVERED ITEM OR SERVICE: HCPCS | Performed by: ORTHOPAEDIC SURGERY

## 2025-02-14 PROCEDURE — 25010000002 SUCCINYLCHOLINE PER 20 MG

## 2025-02-14 PROCEDURE — 25010000002 VANCOMYCIN 1 G RECONSTITUTED SOLUTION: Performed by: ORTHOPAEDIC SURGERY

## 2025-02-14 PROCEDURE — 25010000002 VANCOMYCIN HCL 1.25 G RECONSTITUTED SOLUTION 1 EACH VIAL: Performed by: ORTHOPAEDIC SURGERY

## 2025-02-14 PROCEDURE — 23472 RECONSTRUCT SHOULDER JOINT: CPT | Performed by: ORTHOPAEDIC SURGERY

## 2025-02-14 PROCEDURE — 25010000002 PROPOFOL 200 MG/20ML EMULSION

## 2025-02-14 PROCEDURE — 93010 ELECTROCARDIOGRAM REPORT: CPT | Performed by: INTERNAL MEDICINE

## 2025-02-14 PROCEDURE — 94761 N-INVAS EAR/PLS OXIMETRY MLT: CPT

## 2025-02-14 PROCEDURE — 25010000002 DEXAMETHASONE PER 1 MG

## 2025-02-14 PROCEDURE — 25810000003 SODIUM CHLORIDE 0.9 % SOLUTION 250 ML FLEX CONT: Performed by: ORTHOPAEDIC SURGERY

## 2025-02-14 PROCEDURE — 25010000002 GLYCOPYRROLATE 0.2 MG/ML SOLUTION

## 2025-02-14 PROCEDURE — 25010000002 LIDOCAINE 2% SOLUTION

## 2025-02-14 PROCEDURE — 63710000001 ACETAMINOPHEN 325 MG TABLET: Performed by: ORTHOPAEDIC SURGERY

## 2025-02-14 DEVICE — IMPLANTABLE DEVICE
Type: IMPLANTABLE DEVICE | Site: SHOULDER | Status: FUNCTIONAL
Brand: COMPREHENSIVE® REVERSE SHOULDER

## 2025-02-14 DEVICE — IMPLANTABLE DEVICE
Type: IMPLANTABLE DEVICE | Site: SHOULDER | Status: FUNCTIONAL
Brand: COMPREHENSIVE® VERSA-DIAL®

## 2025-02-14 DEVICE — KNOTLESS TISSUE CONTROL DEVICE, UNDYED UNIDIRECTIONAL (ANTIBACTERIAL) SYNTHETIC ABSORBABLE DEVICE
Type: IMPLANTABLE DEVICE | Site: SHOULDER | Status: FUNCTIONAL
Brand: STRATAFIX

## 2025-02-14 DEVICE — FW,BPB #2 SUTR,BLU W/NDL
Type: IMPLANTABLE DEVICE | Site: SHOULDER | Status: FUNCTIONAL
Brand: ARTHREX®

## 2025-02-14 DEVICE — TRY HUM/SHLDR COMPREHENSIVE/REVERSE MINI COCR STD PLS 5MM: Type: IMPLANTABLE DEVICE | Site: SHOULDER | Status: FUNCTIONAL

## 2025-02-14 DEVICE — BEAR HUM/SHLDR COMPREHENSIVE MINI VIT/E STD 40MM: Type: IMPLANTABLE DEVICE | Site: SHOULDER | Status: FUNCTIONAL

## 2025-02-14 DEVICE — CP SHLDR PSI/SIGN GUIDE: Type: IMPLANTABLE DEVICE | Site: SHOULDER | Status: FUNCTIONAL

## 2025-02-14 DEVICE — IMPLANTABLE DEVICE
Type: IMPLANTABLE DEVICE | Site: SHOULDER | Status: FUNCTIONAL
Brand: COMPREHENSIVE® SHOULDER SYSTEM

## 2025-02-14 DEVICE — TOTL SHLDER REV: Type: IMPLANTABLE DEVICE | Site: SHOULDER | Status: FUNCTIONAL

## 2025-02-14 RX ORDER — ACETAMINOPHEN 325 MG/1
1000 TABLET ORAL ONCE
Status: COMPLETED | OUTPATIENT
Start: 2025-02-14 | End: 2025-02-14

## 2025-02-14 RX ORDER — DIPHENHYDRAMINE HYDROCHLORIDE 50 MG/ML
12.5 INJECTION INTRAMUSCULAR; INTRAVENOUS
Status: DISCONTINUED | OUTPATIENT
Start: 2025-02-14 | End: 2025-02-14 | Stop reason: HOSPADM

## 2025-02-14 RX ORDER — NALOXONE HCL 0.4 MG/ML
0.4 VIAL (ML) INJECTION
Status: DISCONTINUED | OUTPATIENT
Start: 2025-02-14 | End: 2025-02-15 | Stop reason: HOSPADM

## 2025-02-14 RX ORDER — FAMOTIDINE 10 MG/ML
20 INJECTION, SOLUTION INTRAVENOUS
Status: COMPLETED | OUTPATIENT
Start: 2025-02-14 | End: 2025-02-14

## 2025-02-14 RX ORDER — SODIUM CHLORIDE 0.9 % (FLUSH) 0.9 %
10 SYRINGE (ML) INJECTION AS NEEDED
Status: DISCONTINUED | OUTPATIENT
Start: 2025-02-14 | End: 2025-02-14 | Stop reason: HOSPADM

## 2025-02-14 RX ORDER — SODIUM CHLORIDE, SODIUM LACTATE, POTASSIUM CHLORIDE, CALCIUM CHLORIDE 600; 310; 30; 20 MG/100ML; MG/100ML; MG/100ML; MG/100ML
100 INJECTION, SOLUTION INTRAVENOUS CONTINUOUS
Status: ACTIVE | OUTPATIENT
Start: 2025-02-14 | End: 2025-02-14

## 2025-02-14 RX ORDER — TRANEXAMIC ACID 10 MG/ML
1000 INJECTION, SOLUTION INTRAVENOUS ONCE
Status: COMPLETED | OUTPATIENT
Start: 2025-02-14 | End: 2025-02-14

## 2025-02-14 RX ORDER — FENTANYL CITRATE 50 UG/ML
25 INJECTION, SOLUTION INTRAMUSCULAR; INTRAVENOUS
Status: DISCONTINUED | OUTPATIENT
Start: 2025-02-14 | End: 2025-02-14 | Stop reason: HOSPADM

## 2025-02-14 RX ORDER — ACETAMINOPHEN 325 MG/1
975 TABLET ORAL 4 TIMES DAILY
Status: DISCONTINUED | OUTPATIENT
Start: 2025-02-14 | End: 2025-02-15 | Stop reason: HOSPADM

## 2025-02-14 RX ORDER — FENTANYL CITRATE 50 UG/ML
INJECTION, SOLUTION INTRAMUSCULAR; INTRAVENOUS AS NEEDED
Status: DISCONTINUED | OUTPATIENT
Start: 2025-02-14 | End: 2025-02-14 | Stop reason: SURG

## 2025-02-14 RX ORDER — DEXAMETHASONE SODIUM PHOSPHATE 4 MG/ML
8 INJECTION, SOLUTION INTRA-ARTICULAR; INTRALESIONAL; INTRAMUSCULAR; INTRAVENOUS; SOFT TISSUE ONCE AS NEEDED
Status: COMPLETED | OUTPATIENT
Start: 2025-02-14 | End: 2025-02-14

## 2025-02-14 RX ORDER — HYDROCODONE BITARTRATE AND ACETAMINOPHEN 5; 325 MG/1; MG/1
1 TABLET ORAL ONCE AS NEEDED
Status: DISCONTINUED | OUTPATIENT
Start: 2025-02-14 | End: 2025-02-14 | Stop reason: HOSPADM

## 2025-02-14 RX ORDER — LIDOCAINE HYDROCHLORIDE 20 MG/ML
INJECTION, SOLUTION INFILTRATION; PERINEURAL AS NEEDED
Status: DISCONTINUED | OUTPATIENT
Start: 2025-02-14 | End: 2025-02-14 | Stop reason: SURG

## 2025-02-14 RX ORDER — DEXMEDETOMIDINE HYDROCHLORIDE 100 UG/ML
INJECTION, SOLUTION INTRAVENOUS
Status: COMPLETED | OUTPATIENT
Start: 2025-02-14 | End: 2025-02-14

## 2025-02-14 RX ORDER — PROPOFOL 10 MG/ML
INJECTION, EMULSION INTRAVENOUS AS NEEDED
Status: DISCONTINUED | OUTPATIENT
Start: 2025-02-14 | End: 2025-02-14 | Stop reason: SURG

## 2025-02-14 RX ORDER — FAMOTIDINE 20 MG/1
40 TABLET, FILM COATED ORAL DAILY
Status: DISCONTINUED | OUTPATIENT
Start: 2025-02-15 | End: 2025-02-15 | Stop reason: HOSPADM

## 2025-02-14 RX ORDER — ONDANSETRON 2 MG/ML
4 INJECTION INTRAMUSCULAR; INTRAVENOUS ONCE AS NEEDED
Status: COMPLETED | OUTPATIENT
Start: 2025-02-14 | End: 2025-02-14

## 2025-02-14 RX ORDER — TRANEXAMIC ACID 10 MG/ML
1000 INJECTION, SOLUTION INTRAVENOUS ONCE
Status: DISCONTINUED | OUTPATIENT
Start: 2025-02-14 | End: 2025-02-14 | Stop reason: HOSPADM

## 2025-02-14 RX ORDER — SUCCINYLCHOLINE CHLORIDE 20 MG/ML
INJECTION INTRAMUSCULAR; INTRAVENOUS AS NEEDED
Status: DISCONTINUED | OUTPATIENT
Start: 2025-02-14 | End: 2025-02-14 | Stop reason: SURG

## 2025-02-14 RX ORDER — METOPROLOL SUCCINATE 50 MG/1
50 TABLET, EXTENDED RELEASE ORAL DAILY
Status: DISCONTINUED | OUTPATIENT
Start: 2025-02-15 | End: 2025-02-15 | Stop reason: HOSPADM

## 2025-02-14 RX ORDER — OXYCODONE HYDROCHLORIDE 5 MG/1
5 TABLET ORAL EVERY 4 HOURS PRN
Status: DISCONTINUED | OUTPATIENT
Start: 2025-02-14 | End: 2025-02-15 | Stop reason: HOSPADM

## 2025-02-14 RX ORDER — ROSUVASTATIN CALCIUM 5 MG/1
5 TABLET, COATED ORAL NIGHTLY
Status: DISCONTINUED | OUTPATIENT
Start: 2025-02-14 | End: 2025-02-15 | Stop reason: HOSPADM

## 2025-02-14 RX ORDER — EPHEDRINE SULFATE 50 MG/ML
INJECTION INTRAVENOUS AS NEEDED
Status: DISCONTINUED | OUTPATIENT
Start: 2025-02-14 | End: 2025-02-14 | Stop reason: SURG

## 2025-02-14 RX ORDER — GLYCOPYRROLATE 0.2 MG/ML
INJECTION INTRAMUSCULAR; INTRAVENOUS AS NEEDED
Status: DISCONTINUED | OUTPATIENT
Start: 2025-02-14 | End: 2025-02-14 | Stop reason: SURG

## 2025-02-14 RX ORDER — ROCURONIUM BROMIDE 10 MG/ML
INJECTION, SOLUTION INTRAVENOUS AS NEEDED
Status: DISCONTINUED | OUTPATIENT
Start: 2025-02-14 | End: 2025-02-14 | Stop reason: SURG

## 2025-02-14 RX ORDER — ASPIRIN 81 MG/1
81 TABLET ORAL 2 TIMES DAILY
Status: DISCONTINUED | OUTPATIENT
Start: 2025-02-15 | End: 2025-02-15 | Stop reason: HOSPADM

## 2025-02-14 RX ORDER — MELOXICAM 7.5 MG/1
7.5 TABLET ORAL DAILY
Status: DISCONTINUED | OUTPATIENT
Start: 2025-02-15 | End: 2025-02-15 | Stop reason: HOSPADM

## 2025-02-14 RX ORDER — BUPIVACAINE HYDROCHLORIDE 5 MG/ML
INJECTION, SOLUTION EPIDURAL; INTRACAUDAL
Status: COMPLETED | OUTPATIENT
Start: 2025-02-14 | End: 2025-02-14

## 2025-02-14 RX ORDER — OXYCODONE HYDROCHLORIDE 5 MG/1
10 TABLET ORAL EVERY 4 HOURS PRN
Status: DISCONTINUED | OUTPATIENT
Start: 2025-02-14 | End: 2025-02-15 | Stop reason: HOSPADM

## 2025-02-14 RX ORDER — VANCOMYCIN HYDROCHLORIDE 1 G/20ML
INJECTION, POWDER, LYOPHILIZED, FOR SOLUTION INTRAVENOUS AS NEEDED
Status: DISCONTINUED | OUTPATIENT
Start: 2025-02-14 | End: 2025-02-14 | Stop reason: HOSPADM

## 2025-02-14 RX ORDER — ONDANSETRON 4 MG/1
4 TABLET, ORALLY DISINTEGRATING ORAL EVERY 6 HOURS PRN
Status: DISCONTINUED | OUTPATIENT
Start: 2025-02-14 | End: 2025-02-15 | Stop reason: HOSPADM

## 2025-02-14 RX ORDER — MIDAZOLAM HYDROCHLORIDE 2 MG/2ML
0.5 INJECTION, SOLUTION INTRAMUSCULAR; INTRAVENOUS
Status: DISCONTINUED | OUTPATIENT
Start: 2025-02-14 | End: 2025-02-14

## 2025-02-14 RX ORDER — LIDOCAINE HYDROCHLORIDE 10 MG/ML
0.5 INJECTION, SOLUTION EPIDURAL; INFILTRATION; INTRACAUDAL; PERINEURAL ONCE AS NEEDED
Status: DISCONTINUED | OUTPATIENT
Start: 2025-02-14 | End: 2025-02-14 | Stop reason: HOSPADM

## 2025-02-14 RX ORDER — MAGNESIUM HYDROXIDE 1200 MG/15ML
LIQUID ORAL AS NEEDED
Status: DISCONTINUED | OUTPATIENT
Start: 2025-02-14 | End: 2025-02-14 | Stop reason: HOSPADM

## 2025-02-14 RX ORDER — ALLOPURINOL 100 MG/1
100 TABLET ORAL DAILY
Status: DISCONTINUED | OUTPATIENT
Start: 2025-02-15 | End: 2025-02-15 | Stop reason: HOSPADM

## 2025-02-14 RX ORDER — ONDANSETRON 2 MG/ML
4 INJECTION INTRAMUSCULAR; INTRAVENOUS EVERY 6 HOURS PRN
Status: DISCONTINUED | OUTPATIENT
Start: 2025-02-14 | End: 2025-02-15 | Stop reason: HOSPADM

## 2025-02-14 RX ORDER — SODIUM CHLORIDE 0.9 % (FLUSH) 0.9 %
10 SYRINGE (ML) INJECTION EVERY 12 HOURS SCHEDULED
Status: DISCONTINUED | OUTPATIENT
Start: 2025-02-14 | End: 2025-02-14 | Stop reason: HOSPADM

## 2025-02-14 RX ORDER — PHENYLEPHRINE HYDROCHLORIDE 10 MG/ML
INJECTION INTRAVENOUS AS NEEDED
Status: DISCONTINUED | OUTPATIENT
Start: 2025-02-14 | End: 2025-02-14 | Stop reason: SURG

## 2025-02-14 RX ORDER — ONDANSETRON 2 MG/ML
4 INJECTION INTRAMUSCULAR; INTRAVENOUS ONCE AS NEEDED
Status: DISCONTINUED | OUTPATIENT
Start: 2025-02-14 | End: 2025-02-14 | Stop reason: HOSPADM

## 2025-02-14 RX ORDER — SODIUM CHLORIDE 9 MG/ML
40 INJECTION, SOLUTION INTRAVENOUS AS NEEDED
Status: DISCONTINUED | OUTPATIENT
Start: 2025-02-14 | End: 2025-02-14 | Stop reason: HOSPADM

## 2025-02-14 RX ORDER — TAMSULOSIN HYDROCHLORIDE 0.4 MG/1
0.8 CAPSULE ORAL DAILY
Status: DISCONTINUED | OUTPATIENT
Start: 2025-02-14 | End: 2025-02-15 | Stop reason: HOSPADM

## 2025-02-14 RX ADMIN — ROCURONIUM 25 MG: 50 INJECTION, SOLUTION INTRAVENOUS at 09:55

## 2025-02-14 RX ADMIN — PHENYLEPHRINE HYDROCHLORIDE 100 MCG: 10 INJECTION INTRAVENOUS at 11:33

## 2025-02-14 RX ADMIN — EPHEDRINE SULFATE 5 MG: 50 INJECTION INTRAVENOUS at 11:19

## 2025-02-14 RX ADMIN — PHENYLEPHRINE HYDROCHLORIDE 100 MCG: 10 INJECTION INTRAVENOUS at 11:19

## 2025-02-14 RX ADMIN — ROSUVASTATIN CALCIUM 5 MG: 5 TABLET, FILM COATED ORAL at 20:16

## 2025-02-14 RX ADMIN — EPHEDRINE SULFATE 10 MG: 50 INJECTION INTRAVENOUS at 11:16

## 2025-02-14 RX ADMIN — FAMOTIDINE 20 MG: 10 INJECTION INTRAVENOUS at 07:34

## 2025-02-14 RX ADMIN — SODIUM CHLORIDE 2000 MG: 9 INJECTION, SOLUTION INTRAVENOUS at 18:05

## 2025-02-14 RX ADMIN — CEFAZOLIN 2000 MG: 2 INJECTION, POWDER, FOR SOLUTION INTRAVENOUS at 09:51

## 2025-02-14 RX ADMIN — VANCOMYCIN HYDROCHLORIDE 1250 MG: 1.25 INJECTION, POWDER, LYOPHILIZED, FOR SOLUTION INTRAVENOUS at 08:48

## 2025-02-14 RX ADMIN — PHENYLEPHRINE HYDROCHLORIDE 100 MCG: 10 INJECTION INTRAVENOUS at 12:06

## 2025-02-14 RX ADMIN — PROPOFOL 140 MG: 10 INJECTION, EMULSION INTRAVENOUS at 09:45

## 2025-02-14 RX ADMIN — ONDANSETRON 4 MG: 2 INJECTION INTRAMUSCULAR; INTRAVENOUS at 07:34

## 2025-02-14 RX ADMIN — TAMSULOSIN HYDROCHLORIDE 0.8 MG: 0.4 CAPSULE ORAL at 18:05

## 2025-02-14 RX ADMIN — EPHEDRINE SULFATE 10 MG: 50 INJECTION INTRAVENOUS at 10:12

## 2025-02-14 RX ADMIN — ACETAMINOPHEN 975 MG: 325 TABLET ORAL at 20:16

## 2025-02-14 RX ADMIN — PHENYLEPHRINE HYDROCHLORIDE 100 MCG: 10 INJECTION INTRAVENOUS at 11:59

## 2025-02-14 RX ADMIN — GLYCOPYRROLATE 0.1 MG: 0.2 INJECTION INTRAMUSCULAR; INTRAVENOUS at 10:24

## 2025-02-14 RX ADMIN — PHENYLEPHRINE HYDROCHLORIDE 100 MCG: 10 INJECTION INTRAVENOUS at 11:28

## 2025-02-14 RX ADMIN — ACETAMINOPHEN 975 MG: 325 TABLET ORAL at 18:05

## 2025-02-14 RX ADMIN — ROCURONIUM 5 MG: 50 INJECTION, SOLUTION INTRAVENOUS at 09:45

## 2025-02-14 RX ADMIN — BUPIVACAINE HYDROCHLORIDE 15 ML: 5 INJECTION, SOLUTION EPIDURAL; INTRACAUDAL; PERINEURAL at 08:31

## 2025-02-14 RX ADMIN — ACETAMINOPHEN 975 MG: 325 TABLET ORAL at 07:34

## 2025-02-14 RX ADMIN — VANCOMYCIN HYDROCHLORIDE 1250 MG: 1.25 INJECTION, POWDER, LYOPHILIZED, FOR SOLUTION INTRAVENOUS at 20:16

## 2025-02-14 RX ADMIN — PHENYLEPHRINE HYDROCHLORIDE 360 MCG: 10 INJECTION INTRAVENOUS at 10:36

## 2025-02-14 RX ADMIN — SUCCINYLCHOLINE CHLORIDE 100 MG: 20 INJECTION, SOLUTION INTRAMUSCULAR; INTRAVENOUS at 09:46

## 2025-02-14 RX ADMIN — SUGAMMADEX 200 MG: 100 INJECTION, SOLUTION INTRAVENOUS at 11:43

## 2025-02-14 RX ADMIN — EPHEDRINE SULFATE 5 MG: 50 INJECTION INTRAVENOUS at 10:36

## 2025-02-14 RX ADMIN — TRANEXAMIC ACID 1000 MG: 10 INJECTION, SOLUTION INTRAVENOUS at 10:00

## 2025-02-14 RX ADMIN — TRANEXAMIC ACID 1000 MG: 10 INJECTION, SOLUTION INTRAVENOUS at 11:31

## 2025-02-14 RX ADMIN — SODIUM CHLORIDE, POTASSIUM CHLORIDE, SODIUM LACTATE AND CALCIUM CHLORIDE 100 ML/HR: 600; 310; 30; 20 INJECTION, SOLUTION INTRAVENOUS at 07:33

## 2025-02-14 RX ADMIN — DEXAMETHASONE SODIUM PHOSPHATE 8 MG: 4 INJECTION, SOLUTION INTRAMUSCULAR; INTRAVENOUS at 07:33

## 2025-02-14 RX ADMIN — DEXMEDETOMIDINE 20 MCG: 100 INJECTION, SOLUTION, CONCENTRATE INTRAVENOUS at 08:31

## 2025-02-14 RX ADMIN — FENTANYL CITRATE 25 MCG: 50 INJECTION, SOLUTION INTRAMUSCULAR; INTRAVENOUS at 09:44

## 2025-02-14 RX ADMIN — FENTANYL CITRATE 25 MCG: 50 INJECTION, SOLUTION INTRAMUSCULAR; INTRAVENOUS at 10:22

## 2025-02-14 RX ADMIN — LIDOCAINE HYDROCHLORIDE 60 MG: 20 INJECTION, SOLUTION INFILTRATION; PERINEURAL at 09:44

## 2025-02-14 RX ADMIN — PHENYLEPHRINE HYDROCHLORIDE 320 MCG: 10 INJECTION INTRAVENOUS at 10:49

## 2025-02-14 RX ADMIN — PHENYLEPHRINE HYDROCHLORIDE 100 MCG: 10 INJECTION INTRAVENOUS at 11:48

## 2025-02-14 RX ADMIN — EPHEDRINE SULFATE 5 MG: 50 INJECTION INTRAVENOUS at 11:05

## 2025-02-14 RX ADMIN — PHENYLEPHRINE HYDROCHLORIDE 100 MCG: 10 INJECTION INTRAVENOUS at 10:09

## 2025-02-14 RX ADMIN — GLYCOPYRROLATE 0.2 MG: 0.2 INJECTION INTRAMUSCULAR; INTRAVENOUS at 08:20

## 2025-02-14 RX ADMIN — ROCURONIUM 20 MG: 50 INJECTION, SOLUTION INTRAVENOUS at 10:00

## 2025-02-14 RX ADMIN — PHENYLEPHRINE HYDROCHLORIDE 100 MCG: 10 INJECTION INTRAVENOUS at 11:39

## 2025-02-14 RX ADMIN — PROPOFOL 40 MG: 10 INJECTION, EMULSION INTRAVENOUS at 10:22

## 2025-02-14 RX ADMIN — EPHEDRINE SULFATE 5 MG: 50 INJECTION INTRAVENOUS at 10:40

## 2025-02-14 NOTE — ANESTHESIA PREPROCEDURE EVALUATION
Anesthesia Evaluation     Patient summary reviewed and Nursing notes reviewed   no history of anesthetic complications:   NPO Solid Status: > 8 hours  NPO Liquid Status: > 2 hours           Airway   Mallampati: II  TM distance: >3 FB  Neck ROM: full  Possible difficult intubation and Anterior  Dental    (+) poor dentition    Comment: Many missing teeth; nothing loose      Pulmonary    (+) ,sleep apnea on CPAP  Cardiovascular   Exercise tolerance: good (4-7 METS)    ECG reviewed    (+) hypertension, hyperlipidemia    ROS comment: HEART RATE=53  bpm  RR Gvnjixwt=7419  ms  WY Siuirmyj=440  ms  P Horizontal Axis=-2  deg  P Front Axis=65  deg  QRSD Interval=76  ms  QT Cblpyynz=961  ms  SWaA=702  ms  QRS Axis=27  deg  T Wave Axis=26  deg  - ABNORMAL ECG -  Sinus bradycardia  Atrial premature complexes  No change from prior tracing  Electronically Signed By: Madison Malhotra ([facility]) 2024-10-24 16:55:28  Date and Time of Study:2024-10-22 12:46:41    ECHOCARDIOGRAM; [facility], 8/20/2024    This was quite favorable with an ejection fraction of 70%. This is normal/upper normal. There was normal wall thickness and normal LV size.       Coronary calcium score 321 per cardiology note in 06/2024          Neuro/Psych  (+) psychiatric history Anxiety  GI/Hepatic/Renal/Endo      Musculoskeletal     Abdominal    Substance History - negative use     OB/GYN          Other   arthritis,     ROS/Med Hx Other: 20 ounces gatorade at 0500                    Anesthesia Plan    ASA 2     general with block     (Consents to ISB.)  intravenous induction     Anesthetic plan, risks, benefits, and alternatives have been provided, discussed and informed consent has been obtained with: patient.  Pre-procedure education provided  Use of blood products discussed with patient  Consented to blood products.    Plan discussed with CRNA.      CODE STATUS:

## 2025-02-14 NOTE — ANESTHESIA PROCEDURE NOTES
Airway  Urgency: elective    Date/Time: 2/14/2025 9:47 AM  Airway not difficult    General Information and Staff    Patient location during procedure: OR  CRNA/CAA: Patricia Avalos CRNA    Indications and Patient Condition  Indications for airway management: airway protection    Preoxygenated: yes  Mask difficulty assessment: 2 - vent by mask + OA or adjuvant +/- NMBA    Final Airway Details  Final airway type: endotracheal airway      Successful airway: ETT  Cuffed: yes   Successful intubation technique: direct laryngoscopy  Facilitating devices/methods: intubating stylet  Endotracheal tube insertion site: oral  Blade: Song  Blade size: 2  ETT size (mm): 7.5  Cormack-Lehane Classification: grade IIa - partial view of glottis  Placement verified by: chest auscultation and capnometry   Cuff volume (mL): 8  Measured from: lips  ETT/EBT  to lips (cm): 24  Number of attempts at approach: 1  Assessment: lips, teeth, and gum same as pre-op and atraumatic intubation    Additional Comments  Slightly anterior     Lubricating jelly applied to lips

## 2025-02-14 NOTE — INTERVAL H&P NOTE
H&P reviewed. The patient was examined and there are no changes to the H&P.    Vitals:    02/14/25 0703   BP: 154/74   BP Location: Left arm   Patient Position: Lying   Pulse: 54   Resp: 12   Temp: 98.3 °F (36.8 °C)   TempSrc: Oral   SpO2: 94%   Weight: 88 kg (194 lb)

## 2025-02-14 NOTE — NURSING NOTE
Patient complaining that he needs to void and unable to void. Bladder scan was greater than 550. Charge nurse attempted to intermittent catheterize x2. Notified MD and placed a coude catheter. Voiding trail in the am. Patient and wife state this is not unusual for patient post-op he has had to have castellanos's post-op in the past for a day or two.

## 2025-02-14 NOTE — PLAN OF CARE
Goal Outcome Evaluation:  Plan of Care Reviewed With: patient           Outcome Evaluation: POD #0 r/t R shoulder sx. VSS. Room air. No pain per patient still numb. Flores catheter in place for retention, pull in the AM. Post-op abx given.

## 2025-02-14 NOTE — NURSING NOTE
Assisted pt to stand up beside bed to try and urinate, not successful.  Pt states he has to void, but not able to

## 2025-02-14 NOTE — OP NOTE
Date of Surgery: 2/14/2025    PREOPERATIVE DIAGNOSES: Right shoulder rotator cuff arthropathy with massive rotator cuff tear.     POSTOPERATIVE DIAGNOSES:   Right shoulder rotator cuff arthropathy with massive rotator cuff tear.   Right shoulder biceps tendinopathy    PROCEDURE PERFORMED:   Right reverse total shoulder arthroplasty.   Right shoulder open biceps tenodesis    SURGEON: Thanh White MD     ASSISTANT:   Assistant: Owen Rubio CSA was responsible for performing the following activities: Retraction, Irrigation, Closing, and Placing Dressing and their skilled assistance was necessary for the success of this case.    ANESTHESIA:   general with block    ESTIMATED BLOOD LOSS: 100 mL     DRAIN: None.     COMPLICATION: None.     SPECIMEN: None.     FLUID: Per Anesthesia    URINE OUTPUT: Not recorded.     IMPLANTS: Светлана [employer] comprehensive reverse shoulder 15 mm humeral stem x 83 mm length with a standard tray and +5 mm offset polyethylene liner,  Biomet mini comprehensive baseplate standard offset, large augment placed posterosuperior, with 25 mm central screw and 4 peripheral screws measuring 30, 20, 15, 15. 40 mm glenosphere with 3.5 inferior offset.     FINDINGS: Examination under anesthesia: Passive forward flexion to 90; passive external rotation to 15'; no anterior, posterior, or inferior laxity noted; no open wounds, lacerations, or abrasions over the left shoulder; 2+ radial pulse right wrist.     INDICATIONS: The patient is a pleasant 79 y.o. male who had advanced rotator cuff arthropathy with posterior erosion and significant stiffness and pain to right shoulder. Given the patient's persistence of pain as well as failure of improvement with conservative treatment, including but not limited to physical therapy, injections, activity modification, anti-inflammatory medications, wished to proceed with the above-mentioned procedure.     INFORMED CONSENT: Patient was explained details of the  procedure, as well as risks, benefits, and alternatives as documented on the history and physical, and had all questions answered prior to signing the operative consent form. No guarantees were given in regards to results of the surgery.     DESCRIPTION OF PROCEDURE: The patient was seen, evaluated, and cleared for surgery by Anesthesia. Was met in the preoperative holding area. Operative site was marked, consent was reviewed, history and physical was updated, and preoperative labs were reviewed. Regional block was then placed per Anesthesia and the patient was taken to the operating room and placed in a supine position on the beach chair table. After successful intubation per Anesthesia, the patient was elevated into a slightly seated position approximately 30 degrees of elevation. Head was secured with a facemask. Neck was noted to be in normal anatomic alignment. The patient was secured to the table with a waist strap and all bony prominences were well-padded. Examination under anesthesia was carried out at this point in time. The right arm was then sterilely prepped and draped in a standard fashion.     A formal timeout was completed, including confirmation of history and physical, operative consent, surgical site, patient identification number, and preoperative antibiotic administration. The procedure was then begun with a 10 cm longitudinal incision over the deltopectoral interval heading towards the coracoid proximally with incision through the skin only with a #15 blade followed by subcutaneous dissection with Metzenbaum scissors. Deltopectoral interval was identified at this point in time. Interval was opened, the cephalic vein was protected, and a retractor was placed with adhesions being bluntly dissected in the subacromial and subdeltoid spaces. Brown retractor was placed at this point in time. The three sisters vessels were ligated along the medial neck and biceps tendon was identified at this point in  time.     Biceps was tenodesed in an open fashion to the pectoralis major tendon and the proximal portion of the biceps was resected at this point in time. The bicipital groove was opened, identifying the lesser tuberosity where the subscapularis was noted to be partially intact.  Subscapularis and capsule were released from the lesser tuberosity and the proximal humerus was externally rotated and dislocated at this time.    Inferior capsule was released from the humeral neck around to the midaxillary line and full exposure of the proximal humerus was obtained at this time. Attention was then turned to preparation of the proximal humerus with the opening reamer, progressing in stepwise fashion to a size 15 mm reamer at the bicipital groove. Once a scratch fit was noted with a 15 mm reamer, it was left in place at this point in time. Cutting guide was then positioned in 30 degrees of retroversion and confirmed to appropriately reproduce patient's native retroversion. Cutting guide was then pinned into position at this point in time, reamer was removed, and an oscillating saw was used in standard fashion to make the proximal humerus cut. Osteophytes were then removed at this point in time.  Broaching was then begun with a size 2 broach, proceeding in stepwise fashion to a size 15 which was noted to have good fit and appropriate rotational control.  Thus, size 15 broach was left in place for protection of proximal humerus during preparation of the glenoid    Attention was then turned to glenoid exposure with posterior and anterior glenoid retractors being placed at this point in time. The biceps tendon stump was then identified at this point in time and used to shad the 12-o'clock location, followed by marking of the 3, 6, and 9-o'clock locations on the glenoid face. Labrum was excised 360' from the margins of the glenoid at this time. [employer] signature patient specific guide was placed on the glenoid at this point  in time followed by placement of central wire. A one-step reamer was then used over the wire and advanced until reaming matched the preoperative patient specific plan and achieved at least 50% contact on the glenoid surface.   was placed at this point in time confirming appropriate offset for a large augment.  De-rotation peg was drilled inferiorly at this point followed by placement of augmented reamer guide with removal of central wire, placement of central screw for stabilization, and reaming over the augment reaming guide. Smooth glenoid surface achieved at the augmented site with reaming at this time.  Augmented reaming guide and central screw removed at this point in time followed by replacement of the central wire. The augmented baseplate was then opened on the back table, placed on the impactor, and impacted into position onto the glenoid at this time with good fit noted.  Central wire was removed, depth gauge used to assess the length, and then screw of 25 mm in length placed of the central hole with good bi-cortical bite noted at this time.  4 peripheral screws were drilled in bi-cortical fashion utilizing the locking guide, measured, and screw length of 30 mm placed superior, 20 mm placed inferior, 15 mm placed anterior and 15 mm placed posterior with good bi-cortical bite noted.  Baseplate was then thoroughly irrigated with Betadine lavage followed by normal saline at this time.    Size 40 Glenosphere was assembled on the back table with 3.5 inferior offset.  Glenosphere was then brought to the surgical site, placed onto the baseplate, and impacted into position with a secure fit of the Mercado taper noted with no evidence of disengagement on pulling with a right angle from all sides.     Attention was then returned to the proximal humerus where the trial stem was removed. The canal was thoroughly irrigated at this point in time with Betadine lavage followed by normal saline. The final 15 mm humeral  stem implant was opened on the back table. Humeral stem was then impacted into position in the humeral canal with appropriate version as noted with the version bars. Trial liners were placed at this time, the shoulder was reduced, and noted to be stable throughout range of motion, with appropriate tension on the strap muscles. Full motion was achieved and dislocation had to be performed with 1 fingertip snugly fit in the joint space. The shoulder was then dislocated once again and trial liner was removed. Final implant was opened on the back table. Surrounding tissue was thoroughly irrigated. Then 20 mL of Exparel injection was injected in the periosteal layers on both the glenoid and humeral sides and the implant liner was impacted into position at this point and the shoulder was reduced and noted to be stable throughout range of motion once again. The joint was then thoroughly irrigated once again with normal saline, followed by a Betadine wash, followed by normal saline once again.  1 g of vancomycin powder was placed in the deep and subcutaneous tissue at this time.    Attention was then turned to closure of the wound with 0 Vicryl used for closure of the deltopectoral layer, 2-0 and 3-0 Stratafix for subcutaneous closure, followed by mesh and glue for skin closure. The wound was dressed with Telfa, 4 x 4, Tegaderm, ABD pad, and Medipore tape. The patient was placed in a sling with an abduction pillow to the operative side.     At the end of the procedure, all lap, needle, and sponge counts were correct x2. The patient had brisk capillary refill to all digits of the operative extremity. Compartments were soft and easily compressible at the end of the procedure.     DISPOSITION: The patient was extubated per Anesthesia and taken to the recovery room in stable condition. Will be admitted to the Orthopedic service for pain control and kept in a sling for 6 weeks with range of motion of the elbow, wrist, and hand  initiated within the 1st week as well as gentle pendulum exercises of shoulder. Results of the procedure were discussed immediately postoperatively with the patient's family, and they had all questions answered at that time.     REHAB: Plan for sling use for 6 weeks, start physical therapy a week 4 to work on range of motion      Thanh White M.D.*

## 2025-02-14 NOTE — ANESTHESIA PROCEDURE NOTES
Peripheral Block    Pre-sedation assessment completed: 2/14/2025 8:24 AM    Patient reassessed immediately prior to procedure    Patient location during procedure: pre-op  Start time: 2/14/2025 8:29 AM  Stop time: 2/14/2025 8:31 AM  Reason for block: at surgeon's request and post-op pain management  Performed by  CRNA/CAA: Vera Jack CRNA  Preanesthetic Checklist  Completed: patient identified, IV checked, site marked, risks and benefits discussed, surgical consent, monitors and equipment checked, pre-op evaluation and timeout performed  Prep:  Pt Position: supine  Sterile barriers:cap, gloves, mask and washed/disinfected hands  Prep: ChloraPrep  Patient monitoring: blood pressure monitoring, continuous pulse oximetry and EKG  Procedure    Sedation: yes  Performed under: local infiltration  Guidance:ultrasound guided    ULTRASOUND INTERPRETATION.  Using ultrasound guidance a 21 G gauge needle was placed in close proximity to the nerve, at which point, under ultrasound guidance anesthetic was injected in the area of the nerve and spread of the anesthesia was seen on ultrasound in close proximity thereto.  There were no abnormalities seen on ultrasound; a digital image was taken; and the patient tolerated the procedure with no complications. Images:still images obtained, printed/placed on chart    Laterality:right  Block Type:interscalene  Injection Technique:single-shot  Needle Type:echogenic  Needle Gauge:21 G  Resistance on Injection: none    Medications Used: bupivacaine PF (MARCAINE) injection 0.5% - Perineural   15 mL - 2/14/2025 8:31:00 AM  dexmedetomidine HCl (PRECEDEX) injection - Perineural   20 mcg - 2/14/2025 8:31:00 AM      Medications  Comment:Pretreated bradycardia with 0.2 glyco IV    Post Assessment  Injection Assessment: negative aspiration for heme, no paresthesia on injection and incremental injection  Patient Tolerance:comfortable throughout block  Complications:no  Performed by:  Vera Jack, CRNA

## 2025-02-14 NOTE — ANESTHESIA POSTPROCEDURE EVALUATION
Patient: Cortez Delgado    Procedure Summary       Date: 02/14/25 Room / Location:  [facility] OR 3 /  [facility]    Anesthesia Start: 0939 Anesthesia Stop: 1230    Procedure: reverse shoulder arthroplasty and all associated procedures (Right: Shoulder) Diagnosis:       Primary osteoarthritis of right shoulder      (Primary osteoarthritis of right shoulder [M19.011])    Surgeons: Thanh White MD Provider: Patricia Avalos CRNA    Anesthesia Type: general with block ASA Status: 2            Anesthesia Type: general with block    Vitals  Vitals Value Taken Time   /89 02/14/25 1255   Temp 97.7 °F (36.5 °C) 02/14/25 1225   Pulse 74 02/14/25 1303   Resp 17 02/14/25 1255   SpO2 96 % 02/14/25 1303   Vitals shown include unfiled device data.        Post Anesthesia Care and Evaluation    Patient location during evaluation: bedside  Patient participation: complete - patient participated  Level of consciousness: awake and alert  Pain score: 0  Pain management: adequate    Airway patency: patent  Anesthetic complications: No anesthetic complications  PONV Status: none  Cardiovascular status: acceptable  Respiratory status: acceptable  Hydration status: acceptable  No anesthesia care post op

## 2025-02-15 VITALS
TEMPERATURE: 97.5 F | RESPIRATION RATE: 16 BRPM | OXYGEN SATURATION: 98 % | SYSTOLIC BLOOD PRESSURE: 118 MMHG | HEART RATE: 66 BPM | BODY MASS INDEX: 27.07 KG/M2 | WEIGHT: 194 LBS | DIASTOLIC BLOOD PRESSURE: 58 MMHG

## 2025-02-15 LAB
ANION GAP SERPL CALCULATED.3IONS-SCNC: 9.7 MMOL/L (ref 5–15)
BUN SERPL-MCNC: 19 MG/DL (ref 8–23)
BUN/CREAT SERPL: 16.7 (ref 7–25)
CALCIUM SPEC-SCNC: 8.7 MG/DL (ref 8.6–10.5)
CHLORIDE SERPL-SCNC: 104 MMOL/L (ref 98–107)
CO2 SERPL-SCNC: 24.3 MMOL/L (ref 22–29)
CREAT SERPL-MCNC: 1.14 MG/DL (ref 0.76–1.27)
DEPRECATED RDW RBC AUTO: 42.7 FL (ref 37–54)
EGFRCR SERPLBLD CKD-EPI 2021: 65 ML/MIN/1.73
ERYTHROCYTE [DISTWIDTH] IN BLOOD BY AUTOMATED COUNT: 12.3 % (ref 12.3–15.4)
GLUCOSE SERPL-MCNC: 115 MG/DL (ref 65–99)
HCT VFR BLD AUTO: 34.1 % (ref 37.5–51)
HGB BLD-MCNC: 11.4 G/DL (ref 13–17.7)
MCH RBC QN AUTO: 31.2 PG (ref 26.6–33)
MCHC RBC AUTO-ENTMCNC: 33.4 G/DL (ref 31.5–35.7)
MCV RBC AUTO: 93.4 FL (ref 79–97)
PLATELET # BLD AUTO: 151 10*3/MM3 (ref 140–450)
PMV BLD AUTO: 10.7 FL (ref 6–12)
POTASSIUM SERPL-SCNC: 4 MMOL/L (ref 3.5–5.2)
RBC # BLD AUTO: 3.65 10*6/MM3 (ref 4.14–5.8)
SODIUM SERPL-SCNC: 138 MMOL/L (ref 136–145)
WBC NRBC COR # BLD AUTO: 6.97 10*3/MM3 (ref 3.4–10.8)

## 2025-02-15 PROCEDURE — A9270 NON-COVERED ITEM OR SERVICE: HCPCS | Performed by: ORTHOPAEDIC SURGERY

## 2025-02-15 PROCEDURE — 25010000002 CEFAZOLIN PER 500 MG: Performed by: ORTHOPAEDIC SURGERY

## 2025-02-15 PROCEDURE — 85027 COMPLETE CBC AUTOMATED: CPT | Performed by: ORTHOPAEDIC SURGERY

## 2025-02-15 PROCEDURE — 63710000001 METOPROLOL SUCCINATE XL 50 MG TABLET SUSTAINED-RELEASE 24 HOUR: Performed by: ORTHOPAEDIC SURGERY

## 2025-02-15 PROCEDURE — 63710000001 ACETAMINOPHEN 325 MG TABLET: Performed by: ORTHOPAEDIC SURGERY

## 2025-02-15 PROCEDURE — 97165 OT EVAL LOW COMPLEX 30 MIN: CPT

## 2025-02-15 PROCEDURE — 63710000001 ALLOPURINOL 100 MG TABLET: Performed by: ORTHOPAEDIC SURGERY

## 2025-02-15 PROCEDURE — 97161 PT EVAL LOW COMPLEX 20 MIN: CPT

## 2025-02-15 PROCEDURE — 63710000001 MELOXICAM 7.5 MG TABLET: Performed by: ORTHOPAEDIC SURGERY

## 2025-02-15 PROCEDURE — 63710000001 TAMSULOSIN 0.4 MG CAPSULE: Performed by: ORTHOPAEDIC SURGERY

## 2025-02-15 PROCEDURE — 80048 BASIC METABOLIC PNL TOTAL CA: CPT | Performed by: ORTHOPAEDIC SURGERY

## 2025-02-15 PROCEDURE — 63710000001 ASPIRIN 81 MG TABLET DELAYED-RELEASE: Performed by: ORTHOPAEDIC SURGERY

## 2025-02-15 PROCEDURE — 63710000001 FAMOTIDINE 20 MG TABLET: Performed by: ORTHOPAEDIC SURGERY

## 2025-02-15 PROCEDURE — G0378 HOSPITAL OBSERVATION PER HR: HCPCS

## 2025-02-15 RX ORDER — ONDANSETRON 4 MG/1
4 TABLET, FILM COATED ORAL EVERY 8 HOURS PRN
Qty: 30 TABLET | Refills: 0 | Status: SHIPPED | OUTPATIENT
Start: 2025-02-15

## 2025-02-15 RX ORDER — SENNOSIDES A AND B 8.6 MG/1
1 TABLET, FILM COATED ORAL NIGHTLY
Qty: 20 TABLET | Refills: 0 | Status: SHIPPED | OUTPATIENT
Start: 2025-02-15

## 2025-02-15 RX ORDER — OXYCODONE AND ACETAMINOPHEN 5; 325 MG/1; MG/1
1 TABLET ORAL EVERY 4 HOURS PRN
Qty: 42 TABLET | Refills: 0 | Status: SHIPPED | OUTPATIENT
Start: 2025-02-15

## 2025-02-15 RX ADMIN — METOPROLOL SUCCINATE 50 MG: 50 TABLET, EXTENDED RELEASE ORAL at 08:38

## 2025-02-15 RX ADMIN — TAMSULOSIN HYDROCHLORIDE 0.8 MG: 0.4 CAPSULE ORAL at 08:38

## 2025-02-15 RX ADMIN — MELOXICAM 7.5 MG: 7.5 TABLET ORAL at 08:39

## 2025-02-15 RX ADMIN — SODIUM CHLORIDE 2000 MG: 9 INJECTION, SOLUTION INTRAVENOUS at 02:15

## 2025-02-15 RX ADMIN — ASPIRIN 81 MG: 81 TABLET, COATED ORAL at 08:38

## 2025-02-15 RX ADMIN — ACETAMINOPHEN 975 MG: 325 TABLET ORAL at 08:38

## 2025-02-15 RX ADMIN — ALLOPURINOL 100 MG: 100 TABLET ORAL at 08:39

## 2025-02-15 RX ADMIN — FAMOTIDINE 40 MG: 20 TABLET, FILM COATED ORAL at 08:39

## 2025-02-15 NOTE — DISCHARGE SUMMARY
Orthopedic Discharge Summary      Patient: Cortez Delgado      YOB: 1945    Medical Record Number: [id number]    Attending Physician: Thanh White MD  Consulting Physician(s):   Date of Admission: 2/14/2025  6:46 AM  Date of Discharge: 2/15/2024        Primary osteoarthritis of right shoulder    DJD of shoulder     Status Post: GA ARTHROPLASTY GLENOHUMERAL JOINT TOTAL SHOULDER [26158] (reverse shoulder arthroplasty and all associated procedures)      Allergies   Allergen Reactions    Versed [Midazolam] Irritability     Made him aggressive    Penicillins Other (See Comments) and Rash     Please ask patient reaction       Current Medications:     Discharge Medications        New Medications        Instructions Start Date   ondansetron 4 MG tablet  Commonly known as: Zofran   4 mg, Oral, Every 8 Hours PRN      oxyCODONE-acetaminophen 5-325 MG per tablet  Commonly known as: PERCOCET   1 tablet, Oral, Every 4 Hours PRN      senna 8.6 MG tablet  Commonly known as: SENOKOT   1 tablet, Oral, Nightly             Continue These Medications        Instructions Start Date   allopurinol 100 MG tablet  Commonly known as: ZYLOPRIM   100 mg, Daily      aspirin 81 MG EC tablet   81 mg, Oral, 2 Times Daily      FLINTSTONES W/IRON PO   1 dose, Daily      metoprolol succinate XL 50 MG 24 hr tablet  Commonly known as: TOPROL-XL   1 tablet, Daily      NIACIN CR PO   500 mg, Daily      rosuvastatin 5 MG tablet  Commonly known as: CRESTOR   1 tablet, Daily      valsartan 80 MG tablet  Commonly known as: DIOVAN   1 tablet, Daily      vitamin B-12 1000 MCG tablet  Commonly known as: CYANOCOBALAMIN   1 tablet, Daily      vitamin D3 125 MCG (5000 UT) capsule capsule   1 capsule, Daily                   Past Medical History:   Diagnosis Date    Arthritis     Colon polyps     Fracture of wrist     Approx 12 yrs ago    Frozen shoulder     Hyperlipidemia     Hypertension     Knee swelling     Periarthritis of shoulder      Sleep apnea     cpap    Tendinitis of knee      Past Surgical History:   Procedure Laterality Date    COLONOSCOPY      COLONOSCOPY N/A 10/30/2023    Procedure: COLONOSCOPY TO CECUM;  Surgeon: David Ryder MD;  [place] [facility] [place]  Service: Gastroenterology;  Laterality: N/A;  diverticulosis, polyps,hemorrhoids    COLONOSCOPY W/ POLYPECTOMY N/A 04/20/2023    Procedure: COLONOSCOPY WITH POLYPECTOMY;  Surgeon: David Ryder MD;  [place] [facility] [place];  Service: Gastroenterology;  Laterality: N/A;  DIVERTICULOSIS, POLYPS X4, HEMORRHOIDS    FRACTURE SURGERY      KNEE SURGERY      TOTAL KNEE ARTHROPLASTY Right 11/05/2024    Procedure: total knee arthroplasty and all associated procedures;  Surgeon: Thanh White MD;  Location: [facility];  Service: Robotics - Ortho;  Laterality: Right;    WRIST SURGERY Left     Approx 12 yrs ago     Social History     Occupational History    Not on file   Tobacco Use    Smoking status: Never     Passive exposure: Never    Smokeless tobacco: Never   Vaping Use    Vaping status: Never Used   Substance and Sexual Activity    Alcohol use: Never    Drug use: Never    Sexual activity: Never      Social History     Social History Narrative    Not on file     History reviewed. No pertinent family history.      Physical Exam: 79 y.o. male  General Appearance:    Alert, cooperative, in no acute distress                      Vitals:    02/14/25 2326 02/15/25 0458 02/15/25 0730 02/15/25 1117   BP: 149/67 118/63 116/67 102/56   BP Location: Left arm Left arm Left arm Left arm   Patient Position: Lying Lying Lying Lying   Pulse: 52 64 76 63   Resp: 18 18 18 17   Temp: 97.4 °F (36.3 °C) 97.2 °F (36.2 °C) 98 °F (36.7 °C) 97.6 °F (36.4 °C)   TempSrc: Temporal Temporal Oral Temporal   SpO2: 96% 97% 95% 97%   Weight:            Head:    Normocephalic, without obvious abnormality, atraumatic   Eyes:            Lids and lashes normal, conjunctivae and sclerae normal, no   icterus,  no pallor, corneas clear, PERRLA   Ears:    Ears appear intact with no abnormalities noted   Throat:   No oral lesions, no thrush, oral mucosa moist   Neck:   No adenopathy, supple, trachea midline, no thyromegaly, no    carotid bruit, no JVD   Back:     No kyphosis present, no scoliosis present, no skin lesions,       erythema or scars, no tenderness to percussion or                   palpation,   range of motion normal   Lungs:     Clear to auscultation,respirations regular, even and                   unlabored    Heart:    Regular rhythm and normal rate, normal S1 and S2, no            murmur, no gallop, no rub, no click   Chest Wall:    No abnormalities observed   Abdomen:     Normal bowel sounds, no masses, no organomegaly, soft        non-tender, non-distended, no guarding, no rebound                 tenderness   Rectal:     Deferred   Extremities: Upper extremity-dressing and sling without signs or symptoms of infection.               Neurovascular status remains intact to operative extremity.      Moves all extremities well, no edema, no cyanosis, no              redness   Pulses:   Pulses palpable and equal bilaterally   Skin:   No bleeding, bruising or rash   Lymph nodes:   No palpable adenopathy   Neurologic:   Cranial nerves 2 - 12 grossly intact, sensation intact, DTR        present and equal bilaterally           Hospital Course:  79 y.o. male admitted to [facility] to services of Thanh White MD with Primary osteoarthritis of right shoulder [M19.011]  DJD of shoulder [M19.019] on 2/14/2025 and underwent CO ARTHROPLASTY GLENOHUMERAL JOINT TOTAL SHOULDER [00350] (reverse shoulder arthroplasty and all associated procedures)  Per Thanh White MD. Antibiotic and VTE prophylaxis were per SCIP protocols. Post-operatively the patient transferred to the post-operative floor where the patient underwent mobilization therapy that included ambulation as well as pendulum exercises. Opioids were  titrated to achieve appropriate pain management to allow for participation in mobilization exercises. Vital signs are now stable. The dressing is intact without signs or symptoms of infection. Operative extremity neurovascular status remains intact.  No calf swelling, negative Homans sign, no signs or symptoms consistent with DVT.   Appropriate education re: incision care, activity levels, medications, and follow up visits was completed and all questions were answered. The patient is now deemed stable for discharge to Home.      DIAGNOSTIC TESTS:     Admission on 02/14/2025   Component Date Value Ref Range Status    QT Interval 02/14/2025 448  ms Final    QTC Interval 02/14/2025 428  ms Final    Glucose 02/15/2025 115 (H)  65 - 99 mg/dL Final    BUN 02/15/2025 19  8 - 23 mg/dL Final    Creatinine 02/15/2025 1.14  0.76 - 1.27 mg/dL Final    Sodium 02/15/2025 138  136 - 145 mmol/L Final    Potassium 02/15/2025 4.0  3.5 - 5.2 mmol/L Final    Chloride 02/15/2025 104  98 - 107 mmol/L Final    CO2 02/15/2025 24.3  22.0 - 29.0 mmol/L Final    Calcium 02/15/2025 8.7  8.6 - 10.5 mg/dL Final    BUN/Creatinine Ratio 02/15/2025 16.7  7.0 - 25.0 Final    Anion Gap 02/15/2025 9.7  5.0 - 15.0 mmol/L Final    eGFR 02/15/2025 65.0  >60.0 mL/min/1.73 Final    WBC 02/15/2025 6.97  3.40 - 10.80 10*3/mm3 Final    RBC 02/15/2025 3.65 (L)  4.14 - 5.80 10*6/mm3 Final    Hemoglobin 02/15/2025 11.4 (L)  13.0 - 17.7 g/dL Final    Hematocrit 02/15/2025 34.1 (L)  37.5 - 51.0 % Final    MCV 02/15/2025 93.4  79.0 - 97.0 fL Final    MCH 02/15/2025 31.2  26.6 - 33.0 pg Final    MCHC 02/15/2025 33.4  31.5 - 35.7 g/dL Final    RDW 02/15/2025 12.3  12.3 - 15.4 % Final    RDW-SD 02/15/2025 42.7  37.0 - 54.0 fl Final    MPV 02/15/2025 10.7  6.0 - 12.0 fL Final    Platelets 02/15/2025 151  140 - 450 10*3/mm3 Final       No results found.    Discharge and Follow up Instructions:   See discharge instructions for details  Follow-up in office in 2 weeks with  Judith Jolley, nurse practitioner  Keep sling on at all times except for pendulum exercises reviewed by physical therapist  Take medication daily for DVT prophylaxis- see discharge medications  Flores catheter replaced due to urinary retention, will need follow-up on Monday with [facility]    Date: 2/15/2025    Thanh White MD

## 2025-02-15 NOTE — CASE MANAGEMENT/SOCIAL WORK
Continued Stay Note   Dora     Patient Name: Cortez Delgado  MRN: [id number]  Today's Date: 2/15/2025    Admit Date: 2/14/2025    Plan: Home with spouse, family to transport   Discharge Plan       Row Name 02/15/25 1013       Plan    Plan Home with spouse, family to transport    Patient/Family in Agreement with Plan yes    Plan Comments Met with pt and spouse at [facility], introduced self and explained role. Pt lives with spouse and special needs adult daughter in a tri level home with 1 ESTRELLA. PTA pt was independent for ADLs and mobility. Pt has cane and walker at home. Plan at discharge is for pt to return home with family, family to transport, no needs at this time. CCP will continue to follow for any needs.                   Discharge Codes    No documentation.                       Chip Barrios RN

## 2025-02-15 NOTE — THERAPY DISCHARGE NOTE
Acute Care - Occupational Therapy Discharge  [facility]    Patient Name: Cortez Delgado  : 1945    MRN: [id number]                              Today's Date: 2/15/2025       Admit Date: 2025    Visit Dx:     ICD-10-CM ICD-9-CM   1. Primary osteoarthritis of right shoulder  M19.011 715.11     Patient Active Problem List   Diagnosis    Encounter for screening for malignant neoplasm of colon    Personal history of colonic polyps    Primary osteoarthritis of right knee    S/P TKR (total knee replacement), right    Primary osteoarthritis of right shoulder    DJD of shoulder     Past Medical History:   Diagnosis Date    Arthritis     Colon polyps     Fracture of wrist     Approx 12 yrs ago    Frozen shoulder     Hyperlipidemia     Hypertension     Knee swelling     Periarthritis of shoulder     Sleep apnea     cpap    Tendinitis of knee      Past Surgical History:   Procedure Laterality Date    COLONOSCOPY      COLONOSCOPY N/A 10/30/2023    Procedure: COLONOSCOPY TO CECUM;  Surgeon: David Ryder MD;  [place]: [facility] [place];  Service: Gastroenterology;  Laterality: N/A;  diverticulosis, polyps,hemorrhoids    COLONOSCOPY W/ POLYPECTOMY N/A 2023    Procedure: COLONOSCOPY WITH POLYPECTOMY;  Surgeon: David Ryder MD;  Location: [facility] [place] [facility] [place];  Service: Gastroenterology;  Laterality: N/A;  DIVERTICULOSIS, POLYPS X4, HEMORRHOIDS    FRACTURE SURGERY      KNEE SURGERY      TOTAL KNEE ARTHROPLASTY Right 2024    Procedure: total knee arthroplasty and all associated procedures;  Surgeon: Thanh White MD;  Location: [facility];  Service: Robotics - Ortho;  Laterality: Right;    WRIST SURGERY Left     Approx 12 yrs ago      General Information       Row Name 02/15/25 0852 02/15/25 0732       OT Time and Intention    Subjective Information --  -SD no complaints  -SD    Document Type --  -SD discharge evaluation/summary  -SD    Mode of Treatment --  -SD occupational therapy  -SD     Patient Effort --  -SD --      Row Name 02/15/25 0732          General Information    Patient Profile Reviewed yes  Pt s/p right reverse total shoulder arthroplasty. Pt lives with spouse and daughter with special needs. Pt I with daily tasks prior to sx.  -SD     Existing Precautions/Restrictions non-weight bearing;brace on at all times;right;shoulder  NWB of right RUE and sling use x 6 weeks  -SD     Barriers to Rehab none identified  -SD       Row Name 02/15/25 0732          Occupational Profile    Reason for Services/Referral (Occupational Profile) Education with UE rom program after shoulder sx (non-involved joints of RUE and modified pendulums for shoulder)  -SD     Successful Occupations (Occupational Profile) I with daily tasks  -SD     Occupational History/Life Experiences (Occupational Profile) pt had TKA in 11/24 and recovered well  -SD     Environmental Supports and Barriers (Occupational Profile) spouse  -SD     Patient Goals (Occupational Profile) go home  -SD       Row Name 02/15/25 0732          Living Environment    People in Home spouse;child(sanket), adult  -SD       Row Name 02/15/25 0732          Home Main Entrance    Number of Stairs, Main Entrance one  -SD       Row Name 02/15/25 0732          Stairs Within Home, Primary    Stairs, Within Home, Primary pt lives in tri level home  -SD       Row Name 02/15/25 0732          Cognition    Orientation Status (Cognition) oriented x 4  -SD               User Key  (r) = Recorded By, (t) = Taken By, (c) = Cosigned By      Initials Name Provider Type    SD Alex Inman OTR Occupational Therapist                   Mobility/ADL's       Row Name 02/15/25 0855          Bed Mobility    Comment, (Bed Mobility) deferred. in recliner  -SD       Row Name 02/15/25 0855          Transfers    Transfers sit-stand transfer;stand-sit transfer  -SD     Comment, (Transfers) pt stood from recliner and EOB (mutiple x's when donning clothing)  -SD       Row Name 02/15/25  0855          Sit-Stand Transfer    Sit-Stand [place] (Transfers) supervision  -SD     Assistive Device (Sit-Stand Transfers) other (see comments)  -SD       Row Name 02/15/25 0855          Stand-Sit Transfer    Stand-Sit [place] (Transfers) supervision  -SD       Row Name 02/15/25 0855          Functional Mobility    Functional Mobility- Ind. Level supervision required  -SD     Functional Mobility-Distance (Feet) 200  -SD       Row Name 02/15/25 0855          Activities of Daily Living    BADL Assessment/Intervention upper body dressing;lower body dressing  -SD       Row Name 02/15/25 0855          Mobility    Extremity Weight-bearing Status right upper extremity  -SD     Right Upper Extremity (Weight-bearing Status) non weight-bearing (NWB)  -SD       Row Name 02/15/25 0855          Upper Body Dressing Assessment/Training    [place] Level (Upper Body Dressing) upper body dressing skills;don;pull-over garment;other (see comments);minimum assist (75% patient effort)  shoulder brace  -SD     Comment, (Upper Body Dressing) Education regarding rom/activity restrictions for LUE, impact on daily tasks, compensatory strategies for adls and brace management. Pt states his spouse will be able to assist as needed. Pt has no concerns for adl management.  -SD       Row Name 02/15/25 0855          Lower Body Dressing Assessment/Training    [place] Level (Lower Body Dressing) lower body dressing skills;don;pants/bottoms;socks;moderate assist (50% patient effort)  -SD     Comment, (Lower Body Dressing) mod assist for lb dressing. Spouse will be able to assist as needed at home  -SD               User Key  (r) = Recorded By, (t) = Taken By, (c) = Cosigned By      Initials Name Provider Type    Alex Bergman OTR Occupational Therapist                   Obj/Interventions       Row Name 02/15/25 0858          Sensory Assessment (Somatosensory)    Sensory Assessment (Somatosensory) sensation intact  -SD        Row Name 02/15/25 0858          Range of Motion Comprehensive    Comment, General Range of Motion Pt demonstrated arom of distal RUE. LUE wfl  -SD       Row Name 02/15/25 0858          Motor Skills    Therapeutic Exercise shoulder;elbow/forearm;hand;wrist  Education with modified pendulums for right shoulder and arom program of distal RUE (elbow/forearm/wrist/hand).  -SD       Row Name 02/15/25 0858          Balance    Comment, Balance I with sitting balance and SBA for standing balance  -SD               User Key  (r) = Recorded By, (t) = Taken By, (c) = Cosigned By      Initials Name Provider Type    Alex Bergman OTMADISON Occupational Therapist                   Goals/Plan    No documentation.                  Clinical Impression       Row Name 02/15/25 0900          Pain Assessment    Pre/Posttreatment Pain Comment no c/o pain  -SD       [facility] Name 02/15/25 0900          Plan of Care Review    Plan of Care Reviewed With patient;spouse;son  -SD     Outcome Evaluation Occupational therapy evaluation completed. Pt s/p right reverse total shoulder arthroplasty. Pt lives with his spouse and reports I with basic daily tasks prior to sx. Education provided regarding rom/activity restrictions for RUE, impact on daily tasks, compensatory strategies for adls and brace management. Pt donned clothing with min/mod assistance while seated at EOB. Pt states his spouse will be able to assist as needed with daily tasks. Pt has no concerns for adl management upon discharge to home. Education with modified pendulums for right shoulder and arom program of distal RUE (elbow/forearm/wrist/hand). Pt managed transfers and functional mobility with SBA. No further OT services recommended in acute setting.  -SD       Row Name 02/15/25 0900          Therapy Assessment/Plan (OT)    Patient/Family Therapy Goal Statement (OT) go home  -SD     Rehab Potential (OT) good  -SD     Criteria for Skilled Therapeutic Interventions Met (OT)  no;other (see comments)  Education provided at time of evaluation. Pt has no discharge concerns.  -SD     Therapy Frequency (OT) evaluation only  -SD       Row Name 02/15/25 0900          Therapy Plan Review/Discharge Plan (OT)    Anticipated Discharge Disposition (OT) home with assist  -SD       Row Name 02/15/25 0900          Positioning and Restraints    Pre-Treatment Position sitting in chair/recliner  -SD     Post Treatment Position chair  -SD     In Chair sitting;call light within reach;encouraged to call for assist;with family/caregiver  -SD               User Key  (r) = Recorded By, (t) = Taken By, (c) = Cosigned By      Initials Name Provider Type    Alex Bergman, OTR Occupational Therapist                   Outcome Measures       Row Name 02/15/25 0903          How much help from another is currently needed...    Putting on and taking off regular lower body clothing? 3  -SD     Bathing (including washing, rinsing, and drying) 3  -SD     Toileting (which includes using toilet bed pan or urinal) 3  -SD     Putting on and taking off regular upper body clothing 3  -SD     Taking care of personal grooming (such as brushing teeth) 4  -SD     Eating meals 4  -SD     AM-PAC 6 Clicks Score (OT) 20  -SD       Row Name 02/15/25 0854 02/15/25 0500       How much help from another person do you currently need...    Turning from your back to your side while in flat bed without using bedrails? 3  -LN 3  -DH    Moving from lying on back to sitting on the side of a flat bed without bedrails? 3  -LN 2  -DH    Moving to and from a bed to a chair (including a wheelchair)? 3  -LN 3  -DH    Standing up from a chair using your arms (e.g., wheelchair, bedside chair)? 4  -LN 3  -DH    Climbing 3-5 steps with a railing? 3  -LN 2  -DH    To walk in hospital room? 3  -LN 3  -DH    AM-PAC 6 Clicks Score (PT) 19  -LN 16  -DH    Highest Level of Mobility Goal 6 --> Walk 10 steps or more  -LN 5 --> Static standing  -DH      Rubi  Name 02/15/25 0854          Functional Assessment    Outcome Measure Options AM-PAC 6 Clicks Basic Mobility (PT)  -LN               User Key  (r) = Recorded By, (t) = Taken By, (c) = Cosigned By      Initials Name Provider Type    SD Alex Inman OTR Occupational Therapist    Jazmin Dutta, PT Physical Therapist    Mirtha Salgado, RN Registered Nurse                  Occupational Therapy Education       Title: PT OT SLP Therapies (Resolved)       Topic: Occupational Therapy (Resolved)       Point: ADL training (Resolved)       Description:   Instruct learner(s) on proper safety adaptation and remediation techniques during self care or transfers.   Instruct in proper use of assistive devices.                  Learning Progress Summary            Patient Acceptance, E, VU by SD at 2/15/2025 0734    Comment: Education provided regarding activity/rom restrictions s/p shoulder sx. impact on management of adl's, compensatory strategies for adls and UE rom program (arom of non-involved joints and modified pendulums for shoulder).                      Point: Home exercise program (Resolved)       Description:   Instruct learner(s) on appropriate technique for monitoring, assisting and/or progressing therapeutic exercises/activities.                  Learning Progress Summary            Patient Acceptance, E, VU by SD at 2/15/2025 0734    Comment: Education provided regarding activity/rom restrictions s/p shoulder sx. impact on management of adl's, compensatory strategies for adls and UE rom program (arom of non-involved joints and modified pendulums for shoulder).                                      User Key       Initials Effective Dates Name Provider Type Discipline    SD 06/16/21 -  Alex Inman OTR Occupational Therapist OT                  OT Recommendation and Plan  Therapy Frequency (OT): evaluation only  Plan of Care Review  Plan of Care Reviewed With: patient, spouse, son  Outcome  Evaluation: Occupational therapy evaluation completed. Pt s/p right reverse total shoulder arthroplasty. Pt lives with his spouse and reports I with basic daily tasks prior to sx. Education provided regarding rom/activity restrictions for RUE, impact on daily tasks, compensatory strategies for adls and brace management. Pt donned clothing with min/mod assistance while seated at EOB. Pt states his spouse will be able to assist as needed with daily tasks. Pt has no concerns for adl management upon discharge to home. Education with modified pendulums for right shoulder and arom program of distal RUE (elbow/forearm/wrist/hand). Pt managed transfers and functional mobility with SBA. No further OT services recommended in acute setting.     Time Calculation:   Evaluation Complexity (OT)  Review Occupational Profile/Medical/Therapy History Complexity: brief/low complexity  Assessment, Occupational Performance/Identification of Deficit Complexity: 1-3 performance deficits  Clinical Decision Making Complexity (OT): problem focused assessment/low complexity  Overall Complexity of Evaluation (OT): low complexity     Time Calculation- OT       Row Name 02/15/25 0904             Time Calculation- OT    OT Start Time 0810  -SD      OT Stop Time 0827  -SD      OT Time Calculation (min) 17 min  -SD         Untimed Charges    OT Eval/Re-eval Minutes 17  -SD         Total Minutes    Untimed Charges Total Minutes 17  -SD       Total Minutes 17  -SD                User Key  (r) = Recorded By, (t) = Taken By, (c) = Cosigned By      Initials Name Provider Type    Alex Bergman OTR Occupational Therapist                  Therapy Charges for Today       Code Description Service Date Service Provider Modifiers Qty    [id number]  OT EVAL LOW COMPLEXITY 1 2/15/2025 Alex Inman OTR GO 1               OT Discharge Summary  Anticipated Discharge Disposition (OT): home with assist  Reason for Discharge: Discharge from facility,  other (comment) (pt has no discharge concerns)    Alex Inman, OTR  2/15/2025

## 2025-02-15 NOTE — PLAN OF CARE
Goal Outcome Evaluation:  Plan of Care Reviewed With: patient, spouse, child (wife and son present)           Outcome Evaluation: PT evaluation completed this am. Pt with no c/o any pain and agreeable to therapy. Sling and swath intact R shoulder. Pt transferred sit to stand with SBA and occasional cueing to not use his right hand. Pt ambulated 250 feet with SBA-CGA; no LOB noted but he occasionally weaved a little and reached out for hallway railing. Pt does have a SPC at home that he used after his recent TKA and recommended that he may want to use the cane for the first few days, if he feels off balance, for safety. Pt also ambulated up and down 5 steps x 2 with SBA using railing on L. Plan is for discharge home with family today. Pt and family did not have any concerns with him going home today. No further PT needs at this time in acute care setting.    Anticipated Discharge Disposition (PT): home with assist (home with family assist)

## 2025-02-15 NOTE — PLAN OF CARE
Goal Outcome Evaluation:              Outcome Evaluation: patient post op from shoulder sx, VSS, room air, fingers are partially numb and c/o of no pain, iv abx given, ice packs applied, family at bedside. castellanos catheter removed at 0600 per order.

## 2025-02-15 NOTE — THERAPY DISCHARGE NOTE
Patient Name: Cortez Delgado  : 1945    MRN: [id number]                              Today's Date: 2/15/2025       Acute care Initial evaluation and discharge summary:      Admit Date: 2025    Visit Dx:     ICD-10-CM ICD-9-CM   1. Primary osteoarthritis of right shoulder  M19.011 715.11     Patient Active Problem List   Diagnosis    Encounter for screening for malignant neoplasm of colon    Personal history of colonic polyps    Primary osteoarthritis of right knee    S/P TKR (total knee replacement), right    Primary osteoarthritis of right shoulder    DJD of shoulder     Past Medical History:   Diagnosis Date    Arthritis     Colon polyps     Fracture of wrist     Approx 12 yrs ago    Frozen shoulder     Hyperlipidemia     Hypertension     Knee swelling     Periarthritis of shoulder     Sleep apnea     cpap    Tendinitis of knee      Past Surgical History:   Procedure Laterality Date    COLONOSCOPY      COLONOSCOPY N/A 10/30/2023    Procedure: COLONOSCOPY TO CECUM;  Surgeon: David Ryder MD;  Location: [facility] MAIN OR;  Service: Gastroenterology;  Laterality: N/A;  diverticulosis, polyps,hemorrhoids    COLONOSCOPY W/ POLYPECTOMY N/A 2023    Procedure: COLONOSCOPY WITH POLYPECTOMY;  Surgeon: David Ryder MD;  Location: [facility] MAIN OR;  Service: Gastroenterology;  Laterality: N/A;  DIVERTICULOSIS, POLYPS X4, HEMORRHOIDS    FRACTURE SURGERY      KNEE SURGERY      TOTAL KNEE ARTHROPLASTY Right 2024    Procedure: total knee arthroplasty and all associated procedures;  Surgeon: Thanh White MD;  Location: [facility] OR;  Service: Robotics - Ortho;  Laterality: Right;    WRIST SURGERY Left     Approx 12 yrs ago      General Information       Row Name 02/15/25 0832          Physical Therapy Time and Intention    Document Type discharge evaluation/summary  -LN     Mode of Treatment physical therapy  -LN       Row Name 02/15/25 0832          General Information    Patient Profile  Reviewed yes  Pt admitted on 2/14/25 s/p R reverse TSA and open biceps tenodesis. Pt has hx of R TKA on 11/16/25 and discharged from PT for knee 1 week ago.  -LN     Prior Level of Function independent:;all household mobility;gait;transfer;bed mobility  Pt reports he is independent with all mobility and gait; did use a walker and a cane after his TKA but was no longer using either.  -LN     Existing Precautions/Restrictions non-weight bearing;brace on at all times;fall  R UE; sling and swath on at all times except bathing & exercise.  -LN     Barriers to Rehab none identified  -LN       Row Name 02/15/25 0832          Living Environment    People in Home child(sanket), adult;spouse  Pt lives with wife and special needs adult daughter in tri level home with 1 step to enter home  -LN       Row Name 02/15/25 0832          Home Main Entrance    Number of Stairs, Main Entrance one  -LN       Row Name 02/15/25 0832          Stairs Within Home, Primary    Stairs, Within Home, Primary Pt lives in a tri-level home.  -LN     Stair Railings, Within Home, Primary railing on left side (ascending)  -LN       Row Name 02/15/25 0832          Cognition    Orientation Status (Cognition) oriented x 4  -LN       Row Name 02/15/25 0832          Safety Issues/Impairments Affecting Functional Mobility    Comment, Safety Issues/Impairments (Mobility) Pt does report hx of occasional dizziness amandeep with quick movements and turns.  -LN               User Key  (r) = Recorded By, (t) = Taken By, (c) = Cosigned By      Initials Name Provider Type    Jazmin Dutta, PT Physical Therapist                   Mobility       Row Name 02/15/25 0841          Bed Mobility    Comment, (Bed Mobility) deferred- pt up in chair  -LN       Row Name 02/15/25 0841          Sit-Stand Transfer    Sit-Stand [place] (Transfers) supervision;independent  -LN     Assistive Device (Sit-Stand Transfers) other (see comments)  no AD used  -LN     Comment,  (Sit-Stand Transfer) occasional cueing needed for pt to not use his R hand.  -LN       Row Name 02/15/25 0841          Gait/Stairs (Locomotion)    [place] Level (Gait) contact guard;standby assist  -LN     Assistive Device (Gait) other (see comments)  no AD used  -LN     Distance in Feet (Gait) 250  -LN     [place] Level (Stairs) stand by assist  -LN     Assistive Device (Stairs) other (see comments)  none used  -LN     Handrail Location (Stairs) left side (ascending);left side (descending)  -LN     Number of Steps (Stairs) 5 x 2  -LN     Ascending Technique (Stairs) step-over-step  -LN     Descending Technique (Stairs) step-over-step  -LN     Comment, (Gait/Stairs) Pt ambulated 250 feet with SBA-CGA with sling and swath R UE. No LOB noted but pt occasionally weaved a little and reached out for railing. Good gait speed noted.  -LN       Row Name 02/15/25 0841          Mobility    Extremity Weight-bearing Status right upper extremity  -LN     Right Upper Extremity (Weight-bearing Status) non weight-bearing (NWB)  -LN               User Key  (r) = Recorded By, (t) = Taken By, (c) = Cosigned By      Initials Name Provider Type    Jazmin Dutta, PT Physical Therapist                   Obj/Interventions       Row Name 02/15/25 0846          Range of Motion Comprehensive    Comment, General Range of Motion B LE WFL.  -LN       Row Name 02/15/25 0846          Strength Comprehensive (MMT)    Comment, General Manual Muscle Testing (MMT) Assessment B LE 4+-5/5.  -LN       Row Name 02/15/25 0846          Balance    Comment, Balance sitting balance independent; static standing balance SBA and dynamic SBA-CGA.  -LN               User Key  (r) = Recorded By, (t) = Taken By, (c) = Cosigned By      Initials Name Provider Type    Jazmin Dutta, PT Physical Therapist                   Goals/Plan       Row Name 02/15/25 0881          Bed Mobility Goal 1 (PT)    Activity/Assistive Device (Bed  Mobility Goal 1, PT) --  no goals set due to eval only  -LN               User Key  (r) = Recorded By, (t) = Taken By, (c) = Cosigned By      Initials Name Provider Type    Jazmin Dutta, PT Physical Therapist                   Clinical Impression       Row Name 02/15/25 0848          Pain    Pretreatment Pain Rating 0/10 - no pain  -LN     Posttreatment Pain Rating 0/10 - no pain  -LN     Pre/Posttreatment Pain Comment No c/o any pain.  -LN       Row Name 02/15/25 0848          Plan of Care Review    Plan of Care Reviewed With patient;spouse;child  wife and son present  -LN     Outcome Evaluation PT evaluation completed this am. Pt with no c/o any pain and agreeable to therapy. Sling and swath intact R shoulder. Pt transferred sit to stand with SBA and occasional cueing to not use his right hand. Pt ambulated 250 feet with SBA-CGA; no LOB noted but he occasionally weaved a little and reached out for hallway railing. Pt does have a SPC at home that he used after his recent TKA and recommended that he may want to use the cane for the first few days, if he feels off balance, for safety. Pt also ambulated up and down 5 steps x 2 with SBA using railing on L. Plan is for discharge home with family today. Pt and family did not have any concerns with him going home today. No further PT needs at this time in acute care setting.  -LN       Row Name 02/15/25 0848          Therapy Assessment/Plan (PT)    Criteria for Skilled Interventions Met (PT) other (see comments)  no further PT needs at this time  -LN     Therapy Frequency (PT) evaluation only  -LN     Predicted Duration of Therapy Intervention (PT) No further PT needs at this time.  -LN       Row Name 02/15/25 0848          Positioning and Restraints    Pre-Treatment Position sitting in chair/recliner  -LN     Post Treatment Position chair  -LN     In Chair sitting;call light within reach;encouraged to call for assist;with family/caregiver  R sling and swath  intact  -LN               User Key  (r) = Recorded By, (t) = Taken By, (c) = Cosigned By      Initials Name Provider Type    Jazmin Dutta, PT Physical Therapist                   Outcome Measures       Row Name 02/15/25 0854 02/15/25 0500       How much help from another person do you currently need...    Turning from your back to your side while in flat bed without using bedrails? 3  -LN 3  -DH    Moving from lying on back to sitting on the side of a flat bed without bedrails? 3  -LN 2  -DH    Moving to and from a bed to a chair (including a wheelchair)? 3  -LN 3  -DH    Standing up from a chair using your arms (e.g., wheelchair, bedside chair)? 4  -LN 3  -DH    Climbing 3-5 steps with a railing? 3  -LN 2  -DH    To walk in hospital room? 3  -LN 3  -DH    AM-PAC 6 Clicks Score (PT) 19  -LN 16  -DH    Highest Level of Mobility Goal 6 --> Walk 10 steps or more  -LN 5 --> Static standing  -DH      Row Name 02/15/25 0854          Functional Assessment    Outcome Measure Options AM-PAC 6 Clicks Basic Mobility (PT)  -LN               User Key  (r) = Recorded By, (t) = Taken By, (c) = Cosigned By      Initials Name Provider Type    Jazmin Dutta, PT Physical Therapist    Mirtha Salgado RN Registered Nurse                  Physical Therapy Education       Title: PT OT SLP Therapies (Done)       Topic: Physical Therapy (Resolved)       Point: Mobility training (Resolved)       Learning Progress Summary            Patient MARA Batista TB, D, PRIYA LUEVANO by NORMA at 2/15/2025 0855    Comment: Education on safe functional mobility and gait, NWB R UE; sling and swath intact. Education on donning and doffing sling/swath with pt and wife.   Family MARA Batista TB, D, PRIYA LUEVANO by NORMA at 2/15/2025 0855    Comment: Education on safe functional mobility and gait, NWB R UE; sling and swath intact. Education on donning and doffing sling/swath with pt and wife.   Significant Other MARA Batista TB, D, BASSEM,PRIYA by NORMA at 2/15/2025 0855     Comment: Education on safe functional mobility and gait, NWB R UE; sling and swath intact. Education on donning and doffing sling/swath with pt and wife.                      Point: Body mechanics (Resolved)       Learning Progress Summary            Patient Eager, E,TB,D, VU,DU by LN at 2/15/2025 0855    Comment: Education on safe functional mobility and gait, NWB R UE; sling and swath intact. Education on donning and doffing sling/swath with pt and wife.   Family Eager, E,TB,D, VU,DU by LN at 2/15/2025 0855    Comment: Education on safe functional mobility and gait, NWB R UE; sling and swath intact. Education on donning and doffing sling/swath with pt and wife.   Significant Other Eager, E,TB,D, VU,DU by LN at 2/15/2025 0855    Comment: Education on safe functional mobility and gait, NWB R UE; sling and swath intact. Education on donning and doffing sling/swath with pt and wife.                      Point: Precautions (Resolved)       Learning Progress Summary            Patient Eager, E,TB,D, VU,DU by LN at 2/15/2025 0855    Comment: Education on safe functional mobility and gait, NWB R UE; sling and swath intact. Education on donning and doffing sling/swath with pt and wife.   Family Eager, E,TB,D, VU,DU by LN at 2/15/2025 0855    Comment: Education on safe functional mobility and gait, NWB R UE; sling and swath intact. Education on donning and doffing sling/swath with pt and wife.   Significant Other Eager, E,TB,D, VU,DU by LN at 2/15/2025 0855    Comment: Education on safe functional mobility and gait, NWB R UE; sling and swath intact. Education on donning and doffing sling/swath with pt and wife.                                      User Key       Initials Effective Dates Name Provider Type Discipline    NORMA 06/16/21 -  Jazmin Bauer, PT Physical Therapist PT                  PT Recommendation and Plan     Outcome Evaluation: PT evaluation completed this am. Pt with no c/o any pain and agreeable to  therapy. Sling and swath intact R shoulder. Pt transferred sit to stand with SBA and occasional cueing to not use his right hand. Pt ambulated 250 feet with SBA-CGA; no LOB noted but he occasionally weaved a little and reached out for hallway railing. Pt does have a SPC at home that he used after his recent TKA and recommended that he may want to use the cane for the first few days, if he feels off balance, for safety. Pt also ambulated up and down 5 steps x 2 with SBA using railing on L. Plan is for discharge home with family today. Pt and family did not have any concerns with him going home today. No further PT needs at this time in acute care setting.     Time Calculation:   PT Evaluation Complexity  History, PT Evaluation Complexity: 1-2 personal factors and/or comorbidities  Examination of Body Systems (PT Eval Complexity): 1-2 elements  Clinical Presentation (PT Evaluation Complexity): evolving  Clinical Decision Making (PT Evaluation Complexity): low complexity  Overall Complexity (PT Evaluation Complexity): low complexity     PT Charges       Row Name 02/15/25 0859             Time Calculation    Start Time 0810  -LN      Stop Time 0830  -LN      Time Calculation (min) 20 min  -LN      PT Received On 02/15/25  -LN                User Key  (r) = Recorded By, (t) = Taken By, (c) = Cosigned By      Initials Name Provider Type    LN Jazmin Bauer, PT Physical Therapist                  Therapy Charges for Today       Code Description Service Date Service Provider Modifiers Qty    [id number]  PT EVAL LOW COMPLEXITY 1 2/15/2025 Jazmin Bauer, PT GP 1            PT G-Codes  Outcome Measure Options: AM-PAC 6 Clicks Basic Mobility (PT)  AM-PAC 6 Clicks Score (PT): 19    PT Discharge Summary  Anticipated Discharge Disposition (PT): home with assist  Reason for Discharge: other (comment) (eval only; no further skilled needs)  Outcomes Achieved: Other (Eval only; no further skilled  needs)  Discharge Destination: Home with assist (home with family asssit)    Jazmin Bauer, PT  2/15/2025

## 2025-02-15 NOTE — PROGRESS NOTES
POD# 1 s/p right reverse shoulder arthroplasty    Subjective:Patient states doing well at this point in time, block has worn off and is able to move fingers and thumb without difficulty.  He is still having difficulty with urination with voiding only 30 to 40 cc at a time and still has residuals of approximately 450 cc on bladder scan.  Denies fevers chills or sweats overnight.  Denies any residual numbness or tingling to operative extremity    Objective:  Vitals:    02/14/25 2326 02/15/25 0458 02/15/25 0730 02/15/25 1117   BP: 149/67 118/63 116/67 102/56   BP Location: Left arm Left arm Left arm Left arm   Patient Position: Lying Lying Lying Lying   Pulse: 52 64 76 63   Resp: 18 18 18 17   Temp: 97.4 °F (36.3 °C) 97.2 °F (36.2 °C) 98 °F (36.7 °C) 97.6 °F (36.4 °C)   TempSrc: Temporal Temporal Oral Temporal   SpO2: 96% 97% 95% 97%   Weight:           Results from last 7 days   Lab Units 02/15/25  0552   WBC 10*3/mm3 6.97   HEMOGLOBIN g/dL 11.4*   HEMATOCRIT % 34.1*   PLATELETS 10*3/mm3 151       Results from last 7 days   Lab Units 02/15/25  0552   SODIUM mmol/L 138   POTASSIUM mmol/L 4.0   CHLORIDE mmol/L 104   CO2 mmol/L 24.3   BUN mg/dL 19   CREATININE mg/dL 1.14   GLUCOSE mg/dL 115*   CALCIUM mg/dL 8.7       Exam:    Right upper extremity-dressing clean dry and intact     Flex and extend fingers, thumb, and wrist     Positive sensation light touch all distributions of hand and proximal lateral aspect arm     Brisk cap refill all digits, 2+ radial pulse     Compartments soft and easily compressible        Impression: s/p right reverse shoulder arthroplasty    Plan:  1. PT/OT- gentle pendulum exercises out of the sling, sling for 4 weeks total  2. Pain control- Percocet  3. DVT prophylaxis- aspirin  4. Wound care- keep dressing in place until follow-up  5. Disposition- home with outpatient therapy starting at week 4.   6.  Urology-will replace catheter and discharged home.  We will try to set up follow-up first  thing next week for catheter removal

## 2025-02-15 NOTE — DISCHARGE INSTRUCTIONS
Total Shoulder Joint Replacement Discharge Instructions:    I. ACTIVITIES:  1. Exercises:  Complete exercise program as taught by the hospital physical therapist 2 times per day  Wear sling when in bed and when out of bed (except when doing exercises)  Exercise program will be advanced by the physical therapist  During the day be up ambulating every 2 hours (while awake) for short distances  Complete the ankle pump exercises at least 10 times per hour (while awake)  Elevate operative arm when in bed and for at least 30 minutes during the day.   Use cold packs 20-30 minutes approximately 5 times per day. This should be done before and after completing your exercises and at any time you are experiencing pain/ stiffness in your operative extremity.    2. Activities of Daily Living:  No tub baths, hot tubs, or swimming pools for 4 weeks  May shower and let water run over the adhesive dressing on post-operative day #7 if no drainage. If dressing does come off, cover incision with waterproof band-aids while showering until first post-op visit in the office.     II. RESTRICTIONS  No pushing, pulling, lifting, or weight bearing on operative extremity  Avoid pushing yourself up out of a chair with the operative extremity  Continue to follow shoulder precautions as instructed by hospital physical therapist  Your surgeon will discuss with you when you will be able to drive again.  First week stay inside on even terrain. May go up and down stairs one stair at a time utilizing the hand rail  After one week, you may venture outside.    III. PRECAUTIONS:  Everyone that comes near you should wash their hands  No elective dental, genital-urinary, or colon procedures or surgical procedures for 12 weeks after surgery unless absolutely necessary.   If dental work or surgical procedure is deemed absolutely necessary, you will need to contact your surgeon as you will need to take antibiotics 1 hour prior to any dental work (including  teeth cleanings).  Please discuss with your surgeon prophylactic antibiotics as the length of time this intervention will be necessary for you varies with each patient’s health history and situation.  Avoid sick people. If you must be around someone who is ill, they should wear a mask.  Avoid visits to the Emergency Room or Urgent Care unless you are having a life threatening event.     IV. INCISION CARE:  Keep clear adhesive dressing and gauze in place until follow up visit. If this dressing does come off, then cover with dry gauze and paper tape daily.Wash your hands prior to dressing changes  No creams or ointments to the incisionMay remove dressing once the incision is free of drainage  Do not touch or pick at the incision  Check dressing every day and notify surgeon immediately if any of the following signs or symptoms are noted:  Increase in redness  Increase in swelling around the incision and of the entire extremity  Increase in pain  Drainage oozing from the incision  Pulling apart of the edges of the incision  Increase in overall body temperature (greater than 100.5 degrees)  Any visible sutures or staples will be removed at your 2 week follow up as long as the incision is healing appropriately.     V. MEDICATIONS:     1. Stool Softeners: You will be at greater risk of constipation after surgery due to being less mobile and the pain medications.   Take stool softeners as instructed by your surgeon while on pain medications. Over the counter Colace 100 mg 1-2 capsules twice daily.   If stools become too loose or too frequent, please decreases the dosage or stop the stool softener.  If constipation occurs despite use of stool softeners, you are to continue the stool softeners and add a laxative (Milk of Magnesia 1 ounce daily as needed)  Drink plenty of fluids, and eat fruits and vegetables during your recovery time    2. Pain Medications utilized after surgery are narcotics and the law requires that the  following information be given to all patients that are prescribed narcotics:  CLASSIFICATION: Pain medications are called Opioids and are narcotics  LEGALITIES: It is illegal to share narcotics with others and to drive within 24 hours of taking narcotics  POTENTIAL SIDE EFFECTS: Potential side effects of opioids include: nausea, vomiting, itching, dizziness, drowsiness, dry mouth, constipation, and difficulty urinating.  POTENTIAL ADVERSE EFFECTS:   Opioid tolerance can develop with use of pain medications and this simply means that it requires more and more of the medication to control pain; however, this is seen more in patients that use opioids for longer periods of time.  Opioid dependence can develop with use of Opioids and this simply means that to stop the medication can cause withdrawal symptoms; however, this is seen with patients that use Opioids for longer periods of time.  Opioid addiction can develop with use of Opioids and the incidence of this is very unlikely in patients who take the medications as ordered and stop the medications as instructed.  Opioid overdose can be dangerous, but is unlikely when the medication is taken as ordered and stopped when ordered. It is important not to mix opioids with alcohol or with and type of sedative such as Benadryl as this can lead to over sedation and respiratory difficulty.  DOSAGE:   Pain medications will need to be taken consistently for the first week to decrease pain and promote adequate pain relief and participation in physical therapy.  After the initial surgical pain begins to resolve, you may begin to decrease the pain medication. By the end of 6 weeks, you should be off of pain medications.  Refills will not be given by the office during evening hours, on weekends, or after 12 weeks post-op.  To seek refills on pain medications during the initial 6 week post-operative period, you must call the office 48 hours in advance to request the refill. The  office will then notify you when to  the prescription. DO NOT wait until you are out of the medication to request a refill.    VI. FOLLOW-UP VISITS:  You will need to follow up in the office with Dr White in 2 weeks. Please call [phone] to schedule this appointment.  You will need to follow up with your primary care physician within 4 weeks.  If you have any concerns or suspected complications prior to your follow up visit, please call your surgeons office. Do not wait until your appointment time if you suspect complications. These will need to be addressed in the office promptly.

## 2025-02-15 NOTE — PLAN OF CARE
Goal Outcome Evaluation:  Plan of Care Reviewed With: patient, spouse, son           Outcome Evaluation: Occupational therapy evaluation completed. Pt s/p right reverse total shoulder arthroplasty. Pt lives with his spouse and reports I with basic daily tasks prior to sx. Education provided regarding rom/activity restrictions for RUE, impact on daily tasks, compensatory strategies for adls and brace management. Pt donned clothing with min/mod assistance while seated at EOB. Pt states his spouse will be able to assist as needed with daily tasks. Pt has no concerns for adl management upon discharge to home. Education with modified pendulums for right shoulder and arom program of distal RUE (elbow/forearm/wrist/hand). Pt managed transfers and functional mobility with SBA. No further OT services recommended in acute setting.    Anticipated Discharge Disposition (OT): home with assist

## 2025-02-16 ENCOUNTER — READMISSION MANAGEMENT (OUTPATIENT)
Dept: CALL CENTER | Facility: HOSPITAL | Age: 80
End: 2025-02-16
Payer: MEDICARE

## 2025-02-16 NOTE — OUTREACH NOTE
Prep Survey      Flowsheet Row Responses   [facility] facility patient discharged from? [place]   Is LACE score < 7 ? Yes   Eligibility Readm Mgmt   Discharge diagnosis s/p right reverse shoulder arthroplasty   Does the patient have one of the following disease processes/diagnoses(primary or secondary)? Total Joint Replacement   Does the patient have Home health ordered? No   Is there a DME ordered? No   Prep survey completed? Yes            HUSAM AGUILAR - Registered Nurse

## 2025-02-16 NOTE — CASE MANAGEMENT/SOCIAL WORK
Case Management Discharge Note      Final Note: dc home         Selected Continued Care - Discharged on 2/15/2025 Admission date: 2/14/2025 - Discharge disposition: Home or Self Care      Destination    No services have been selected for the patient.                Durable Medical Equipment    No services have been selected for the patient.                Dialysis/Infusion    No services have been selected for the patient.                Home Medical Care    No services have been selected for the patient.                Therapy    No services have been selected for the patient.                Community Resources    No services have been selected for the patient.                Community & DME    No services have been selected for the patient.                         Final Discharge Disposition Code: 01 - home or self-care

## 2025-02-17 ENCOUNTER — TELEPHONE (OUTPATIENT)
Dept: ORTHOPEDIC SURGERY | Facility: CLINIC | Age: 80
End: 2025-02-17

## 2025-02-17 NOTE — TELEPHONE ENCOUNTER
Caller: Cortez Delgado    Relationship: Self    Best call back number:      What orders are you requesting (i.e. lab or imaging): THE PATIENT     In what timeframe would the patient need to come in: ASAP    Where will you receive your lab/imaging services: [facility]     Additional notes: PATIENT CALLED TO SEE IF ORDER HAS BEEN PLACED FOR HIM TO GO HAVE A CATH REMOVED. PLEASE CALL PATIENT ONCE ORDER HAS BEEN PLACED.

## 2025-02-27 ENCOUNTER — TELEPHONE (OUTPATIENT)
Dept: ORTHOPEDIC SURGERY | Facility: CLINIC | Age: 80
End: 2025-02-27

## 2025-02-27 ENCOUNTER — OFFICE VISIT (OUTPATIENT)
Dept: ORTHOPEDIC SURGERY | Facility: CLINIC | Age: 80
End: 2025-02-27
Payer: MEDICARE

## 2025-02-27 ENCOUNTER — LAB (OUTPATIENT)
Dept: LAB | Facility: HOSPITAL | Age: 80
End: 2025-02-27
Payer: MEDICARE

## 2025-02-27 VITALS — HEIGHT: 71 IN | WEIGHT: 194 LBS | BODY MASS INDEX: 27.16 KG/M2

## 2025-02-27 DIAGNOSIS — M79.89 LEG SWELLING: ICD-10-CM

## 2025-02-27 DIAGNOSIS — Z96.611 S/P REVERSE TOTAL SHOULDER ARTHROPLASTY, RIGHT: Primary | ICD-10-CM

## 2025-02-27 DIAGNOSIS — Z96.611 S/P REVERSE TOTAL SHOULDER ARTHROPLASTY, RIGHT: ICD-10-CM

## 2025-02-27 LAB
ALBUMIN SERPL-MCNC: 3.4 G/DL (ref 3.5–5.2)
ALBUMIN/GLOB SERPL: 1.1 G/DL
ALP SERPL-CCNC: 85 U/L (ref 39–117)
ALT SERPL W P-5'-P-CCNC: 11 U/L (ref 1–41)
ANION GAP SERPL CALCULATED.3IONS-SCNC: 7.1 MMOL/L (ref 5–15)
AST SERPL-CCNC: 19 U/L (ref 1–40)
BASOPHILS # BLD AUTO: 0.05 10*3/MM3 (ref 0–0.2)
BASOPHILS NFR BLD AUTO: 0.8 % (ref 0–1.5)
BILIRUB SERPL-MCNC: 0.5 MG/DL (ref 0–1.2)
BUN SERPL-MCNC: 14 MG/DL (ref 8–23)
BUN/CREAT SERPL: 12 (ref 7–25)
CALCIUM SPEC-SCNC: 9.4 MG/DL (ref 8.6–10.5)
CHLORIDE SERPL-SCNC: 103 MMOL/L (ref 98–107)
CO2 SERPL-SCNC: 28.9 MMOL/L (ref 22–29)
CREAT SERPL-MCNC: 1.17 MG/DL (ref 0.76–1.27)
DEPRECATED RDW RBC AUTO: 42.4 FL (ref 37–54)
EGFRCR SERPLBLD CKD-EPI 2021: 63 ML/MIN/1.73
EOSINOPHIL # BLD AUTO: 0.16 10*3/MM3 (ref 0–0.4)
EOSINOPHIL NFR BLD AUTO: 2.6 % (ref 0.3–6.2)
ERYTHROCYTE [DISTWIDTH] IN BLOOD BY AUTOMATED COUNT: 12.3 % (ref 12.3–15.4)
GLOBULIN UR ELPH-MCNC: 3.1 GM/DL
GLUCOSE SERPL-MCNC: 101 MG/DL (ref 65–99)
HCT VFR BLD AUTO: 32.9 % (ref 37.5–51)
HGB BLD-MCNC: 10.8 G/DL (ref 13–17.7)
IMM GRANULOCYTES # BLD AUTO: 0.06 10*3/MM3 (ref 0–0.05)
IMM GRANULOCYTES NFR BLD AUTO: 1 % (ref 0–0.5)
LYMPHOCYTES # BLD AUTO: 1.07 10*3/MM3 (ref 0.7–3.1)
LYMPHOCYTES NFR BLD AUTO: 17.7 % (ref 19.6–45.3)
MCH RBC QN AUTO: 30.8 PG (ref 26.6–33)
MCHC RBC AUTO-ENTMCNC: 32.8 G/DL (ref 31.5–35.7)
MCV RBC AUTO: 93.7 FL (ref 79–97)
MONOCYTES # BLD AUTO: 0.69 10*3/MM3 (ref 0.1–0.9)
MONOCYTES NFR BLD AUTO: 11.4 % (ref 5–12)
NEUTROPHILS NFR BLD AUTO: 4.03 10*3/MM3 (ref 1.7–7)
NEUTROPHILS NFR BLD AUTO: 66.5 % (ref 42.7–76)
NRBC BLD AUTO-RTO: 0 /100 WBC (ref 0–0.2)
PLATELET # BLD AUTO: 217 10*3/MM3 (ref 140–450)
PMV BLD AUTO: 8.9 FL (ref 6–12)
POTASSIUM SERPL-SCNC: 4.6 MMOL/L (ref 3.5–5.2)
PROT SERPL-MCNC: 6.5 G/DL (ref 6–8.5)
RBC # BLD AUTO: 3.51 10*6/MM3 (ref 4.14–5.8)
SODIUM SERPL-SCNC: 139 MMOL/L (ref 136–145)
WBC NRBC COR # BLD AUTO: 6.06 10*3/MM3 (ref 3.4–10.8)

## 2025-02-27 PROCEDURE — 80053 COMPREHEN METABOLIC PANEL: CPT

## 2025-02-27 PROCEDURE — 36415 COLL VENOUS BLD VENIPUNCTURE: CPT

## 2025-02-27 PROCEDURE — 85025 COMPLETE CBC W/AUTO DIFF WBC: CPT

## 2025-02-27 NOTE — PROGRESS NOTES
Subjective:     Patient ID: Cortez Delgado is a 79 y.o. male.    Chief Complaint:  Status post right reverse total shoulder arthroplasty-2/14/2025  History of Present Illness  Conner returns to clinic today follow-up evaluation regards to his right shoulder, doing fairly well at this point in time.  Denies any numbness or tingling right upper extremity denies any fevers chills or sweats no issues with his incision.  He has been using the sling.  He has noted some lower extremity swelling bilaterally.  He was able to get the Flores out and has been urinating without difficulty since then.    Social History     Occupational History    Not on file   Tobacco Use    Smoking status: Never     Passive exposure: Never    Smokeless tobacco: Never   Vaping Use    Vaping status: Never Used   Substance and Sexual Activity    Alcohol use: Never    Drug use: Never    Sexual activity: Never      Past Medical History:   Diagnosis Date    Arthritis     Colon polyps     Fracture of wrist     Approx 12 yrs ago    Frozen shoulder     Hyperlipidemia     Hypertension     Knee swelling     Periarthritis of shoulder     Sleep apnea     cpap    Tendinitis of knee      Past Surgical History:   Procedure Laterality Date    COLONOSCOPY      COLONOSCOPY N/A 10/30/2023    Procedure: COLONOSCOPY TO CECUM;  Surgeon: David Ryder MD;  Location: [facility] OR;  Service: Gastroenterology;  Laterality: N/A;  diverticulosis, polyps,hemorrhoids    COLONOSCOPY W/ POLYPECTOMY N/A 04/20/2023    Procedure: COLONOSCOPY WITH POLYPECTOMY;  Surgeon: David Ryder MD;  Location: [facility] OR;  Service: Gastroenterology;  Laterality: N/A;  DIVERTICULOSIS, POLYPS X4, HEMORRHOIDS    FRACTURE SURGERY      KNEE SURGERY      TOTAL KNEE ARTHROPLASTY Right 11/05/2024    Procedure: total knee arthroplasty and all associated procedures;  Surgeon: Thanh White MD;  Location: [facility];  Service: Rob[facility] - Ortho;  Laterality: Right;    TOTAL  "SHOULDER ARTHROPLASTY W/ DISTAL CLAVICLE EXCISION Right 2/14/2025    Procedure: reverse shoulder arthroplasty and all associated procedures;  Surgeon: Thanh White MD;  Location: [facility];  Service: Orthopedics;  Laterality: Right;    WRIST SURGERY Left     Approx 12 yrs ago       No family history on file.      Review of Systems        Objective:  Vitals:    02/27/25 0847   Weight: 88 kg (194 lb)   Height: 180.3 cm (70.98\")         02/27/25  0847   Weight: 88 kg (194 lb)     Body mass index is 27.07 kg/m².    Physical Exam    Vital signs reviewed.   General: No acute distress, alert and oriented  Eyes: conjunctiva clear; pupils equally round and reactive  ENT: external ears and nose atraumatic; oropharynx clear  CV: no peripheral edema  Resp: normal respiratory effort  Skin: no rashes or wounds; normal turgor  Psych: mood and affect appropriate; recent and remote memory intact       Physical Exam  Right shoulder-anterior incision clean dry and intact, healing well.  Mild subcutaneous hematoma noted proximally, soft tissue swelling distally with ecchymosis.  Tolerates passive forward flexion 70 degrees, passive external rotation 30 degrees, abduction 40 degrees.  Positive deltoid firing all 3 components.  Flex extend all digits of the right hand as well as thumb at MCP and IP joints 4+ out of 5 strength, flex extend right wrist, elbow range of motion 5 to 130 degrees with 4 out of 5 strength.  Positive sensation light touch all distributions right hand symmetric to the left, 1+ radial pulse.        Imaging:  None today  Assessment:        1. S/P reverse total shoulder arthroplasty, right    2. Leg swelling             Assessment & Plan  Discussed with Conner that he is doing well at this point in time, continue use of sling but demonstrated pendulum exercises to be done multiple times per day, he can come out of the sling if he has an armrest or something to help keep it immobile.  I will go ahead and get a " CMP as well as CBC due to his leg swelling and have staff reach out to his PCP in regards to further treatment as needed.  I encouraged him in the meantime to work on lower extremity elevation.  He has no calf pain and negative Homans' sign on today's exam.  Follow-up in 4 weeks with x-rays right shoulder or sooner if needed.  Referral for outpatient therapy to be started at 6 weeks postop.    Cortez Delgado was in agreement with plan and had all questions answered.     Orders:  Orders Placed This Encounter   Procedures    Comprehensive Metabolic Panel    CBC & Differential       Medications:  No orders of the defined types were placed in this encounter.      Followup:  Return in about 4 weeks (around 3/27/2025) for xrays needed at follow up.    Diagnoses and all orders for this visit:    1. S/P reverse total shoulder arthroplasty, right (Primary)  -     CBC & Differential; Future  -     Comprehensive Metabolic Panel; Future    2. Leg swelling  -     CBC & Differential; Future  -     Comprehensive Metabolic Panel; Future                   Dictated utilizing Dragon dictation     Patient or patient representative verbalized consent for the use of Ambient Listening during the visit with  Thanh White MD for chart documentation. 2/27/2025  08:54 EST

## 2025-02-27 NOTE — TELEPHONE ENCOUNTER
Patient is aware of results and instructions.  I did leave a message for PCP and faxed lab results.      ----- Message from Thanh White sent at 2/27/2025  1:15 PM EST -----  Please call the patient and let him know that his labs overall look good.  If he does not notice improvement with his lower extremity swelling with elevation over the next 3 to 5 days, reach out to his PCP.    If we can please reach out to his PCP now and fax over results from his labs today and just let them know that he is having some lower leg swelling bilaterally, that would be great.

## 2025-03-27 ENCOUNTER — OFFICE VISIT (OUTPATIENT)
Dept: ORTHOPEDIC SURGERY | Facility: CLINIC | Age: 80
End: 2025-03-27
Payer: MEDICARE

## 2025-03-27 VITALS — HEIGHT: 71 IN | BODY MASS INDEX: 27.16 KG/M2 | WEIGHT: 194 LBS

## 2025-03-27 DIAGNOSIS — Z96.611 S/P REVERSE TOTAL SHOULDER ARTHROPLASTY, RIGHT: Primary | ICD-10-CM

## 2025-03-27 PROCEDURE — 99024 POSTOP FOLLOW-UP VISIT: CPT | Performed by: ORTHOPAEDIC SURGERY

## 2025-03-27 RX ORDER — FUROSEMIDE 20 MG/1
TABLET ORAL
COMMUNITY
Start: 2025-03-19

## 2025-03-27 NOTE — PROGRESS NOTES
Name: Cortez Delgado  MRN: [id number]  Diagnosis: s/p right reverse TSA Arthroplasty, date of surgery 2/14/2025    Interval History: Cortez Delgado returns for 6-week postoperative visit stating doing well this point in time, still using sling.  He has not yet started physical therapy.  Denies any numbness or tingling, denies fever chills or sweats, no issues with his incision    Physical Examination:   right shoulder-   Tolerates forward flexion to 95 degrees, tolerates external rotation to 20 degrees.  Positive deltoid firing all 3 components.  Incision is well-healed  There is no active drainage, erythema, or evidence of infection.   Distal motor and sensory exam is grossly intact. Hand is well perfused.    Radiographic review: Radiographs of the right shoulder 3 views, AP Grashey, scapular Y and axillary lateral, ordered and reviewed by me today, indication s/p shoulder arthroplasty, shows that the implant is in good position. There is no evidence of implant loosening, dislocations or fractures. Compared to immediate postop xrays from hospital.     Assessment: Status post right reverse TSA Arthroplasty    Plan:  Conner is doing well at this point in time, start physical therapy-order placed last visit.  Follow-up in 6 weeks for reassessment of motion strength and function.  If he gets into slow progress with his motion I asked him to contact me so we can start an oral steroid.

## 2025-05-08 ENCOUNTER — OFFICE VISIT (OUTPATIENT)
Dept: ORTHOPEDIC SURGERY | Facility: CLINIC | Age: 80
End: 2025-05-08
Payer: MEDICARE

## 2025-05-08 VITALS — BODY MASS INDEX: 27.3 KG/M2 | WEIGHT: 195 LBS | HEIGHT: 71 IN

## 2025-05-08 DIAGNOSIS — Z96.611 S/P REVERSE TOTAL SHOULDER ARTHROPLASTY, RIGHT: Primary | ICD-10-CM

## 2025-05-08 NOTE — PROGRESS NOTES
Subjective:     Patient ID: Cortez Delgado is a 79 y.o. male.    Chief Complaint:  Follow-up status post right reverse total shoulder arthroplasty-2/14/2025  History of Present Illness  The patient returns to the clinic today for a follow-up evaluation regarding his right shoulder.    He reports significant improvement in his condition, attributing it to the physical therapy sessions he has been attending three times a week. He is not experiencing any substantial pain in his shoulder and has no complications with the surgical incision. Additionally, he does not have systemic symptoms such as fevers, chills, or sweats.    Social History     Occupational History    Not on file   Tobacco Use    Smoking status: Never     Passive exposure: Never    Smokeless tobacco: Never   Vaping Use    Vaping status: Never Used   Substance and Sexual Activity    Alcohol use: Never    Drug use: Never    Sexual activity: Never      Past Medical History:   Diagnosis Date    Arthritis     Colon polyps     Fracture of wrist     Approx 12 yrs ago    Frozen shoulder     Hyperlipidemia     Hypertension     Knee swelling     Periarthritis of shoulder     Sleep apnea     cpap    Tendinitis of knee      Past Surgical History:   Procedure Laterality Date    COLONOSCOPY      COLONOSCOPY N/A 10/30/2023    Procedure: COLONOSCOPY TO CECUM;  Surgeon: David Ryder MD;  Location: [facility] OR;  Service: Gastroenterology;  Laterality: N/A;  diverticulosis, polyps,hemorrhoids    COLONOSCOPY W/ POLYPECTOMY N/A 04/20/2023    Procedure: COLONOSCOPY WITH POLYPECTOMY;  Surgeon: David Ryder MD;  Location: [facility] OR;  Service: Gastroenterology;  Laterality: N/A;  DIVERTICULOSIS, POLYPS X4, HEMORRHOIDS    FRACTURE SURGERY      KNEE SURGERY      TOTAL KNEE ARTHROPLASTY Right 11/05/2024    Procedure: total knee arthroplasty and all associated procedures;  Surgeon: Thanh White MD;  Location: [facility] OR;  Service: Robotics - Ortho;   "Laterality: Right;    TOTAL SHOULDER ARTHROPLASTY W/ DISTAL CLAVICLE EXCISION Right 2/14/2025    Procedure: reverse shoulder arthroplasty and all associated procedures;  Surgeon: Thanh White MD;  Location: [facility];  Service: Orthopedics;  Laterality: Right;    WRIST SURGERY Left     Approx 12 yrs ago       History reviewed. No pertinent family history.      Review of Systems        Objective:  Vitals:    05/08/25 0849   Weight: 88.5 kg (195 lb)   Height: 180.3 cm (71\")         05/08/25  0849   Weight: 88.5 kg (195 lb)     Body mass index is 27.2 kg/m².    Physical Exam    Vital signs reviewed.   General: No acute distress, alert and oriented  Eyes: conjunctiva clear; pupils equally round and reactive  ENT: external ears and nose atraumatic; oropharynx clear  CV: no peripheral edema  Resp: normal respiratory effort  Skin: no rashes or wounds; normal turgor  Psych: mood and affect appropriate; recent and remote memory intact       Physical Exam  - Right shoulder:    - Active and passive forward flexion: 150 degrees, 4+ out of 5 strength    - Active and passive external rotation: 40 degrees, 4+ out of 5 strength    - Internal rotation: to L3, 4+ out of 5 strength    - Positive belly press test    - Positive deltoid firing all 3 components    - Brisk capillary refill to all digits    - Radial pulse: 2+        Imaging:  None today  Assessment:        1. S/P reverse total shoulder arthroplasty, right             Assessment & Plan  1. Post-operative status following right shoulder arthroplasty.  He is demonstrating excellent progress in his recovery from the surgical procedure. He reports no significant pain, no issues with his incision, and no systemic symptoms such as fevers, chills, or sweats. Physical examination shows good range of motion and strength in the right shoulder. He is advised to persist with home exercises and transition to a home program. Continued physical therapy for a few more weeks is " recommended to further aid in his recovery. A regimen of strengthening and stretching exercises is also suggested.    Follow-up  The patient will follow up in 9 months or sooner if needed.    PROCEDURE  The patient underwent right shoulder arthroplasty.    Cortez Delgado was in agreement with plan and had all questions answered.     Orders:  No orders of the defined types were placed in this encounter.      Medications:  No orders of the defined types were placed in this encounter.      Followup:  Return in about 9 months (around 2/8/2026) for xrays needed at follow up.    Diagnoses and all orders for this visit:    1. S/P reverse total shoulder arthroplasty, right (Primary)          Dictated utilizing Dragon dictation     Patient or patient representative verbalized consent for the use of Ambient Listening during the visit with  Thanh White MD for chart documentation. 5/8/2025  08:59 EDT

## (undated) DEVICE — COVER,MAYO STAND,STERILE: Brand: MEDLINE

## (undated) DEVICE — WRAP KN COMPR COLD/THERP

## (undated) DEVICE — DRAPE,U/ SHT,SPLIT,PLAS,STERIL: Brand: MEDLINE

## (undated) DEVICE — Device

## (undated) DEVICE — INTENDED USE FOR SURGICAL MARKING ON INTACT SKIN, ALSO PROVIDES A PERMANENT METHOD OF IDENTIFYING OBJECTS IN THE OPERATING ROOM: Brand: WRITESITE® REGULAR TIP SKIN MARKER

## (undated) DEVICE — CANN NASL CO2 TRULINK W/O2 A/

## (undated) DEVICE — LASSO POLYPECTOMY SNARE: Brand: LASSO

## (undated) DEVICE — GLV SURG SENSICARE PF POLYISPRN SZ8 LF

## (undated) DEVICE — DEV CONTRL TISS STRATAFIX SPIRAL MNCRYL UD 3/0 PLS 45CM

## (undated) DEVICE — SOL ISO/ALC RUB 70PCT 4OZ

## (undated) DEVICE — SOL IRR H2O BO 1000ML STRL

## (undated) DEVICE — DUAL CUT SAGITTAL BLADE

## (undated) DEVICE — FOAM BUMP ROUND LARGE: Brand: MEDLINE INDUSTRIES, INC.

## (undated) DEVICE — DRP ROBOTIC ROSA BX/20

## (undated) DEVICE — PAD,NON-ADHERENT,3X8,STERILE,LF,1/PK: Brand: MEDLINE

## (undated) DEVICE — 3M™ IOBAN™ 2 ANTIMICROBIAL INCISE DRAPE 6640EZ: Brand: IOBAN™ 2

## (undated) DEVICE — DECANTER BAG 9": Brand: MEDLINE INDUSTRIES, INC.

## (undated) DEVICE — SUT MNCRYL 4/0 PS2 18 IN

## (undated) DEVICE — DRSNG SURESITE WNDW 4X4.5

## (undated) DEVICE — KT MIX CMT PALAMIX/UNO VAC WO/CMT

## (undated) DEVICE — SUT ABS VICRLY/PLS COAT CR 2/0 CP/2/REV/CUT/NDL 8X18IN UD

## (undated) DEVICE — SHEET, ORTHO, SPLIT, STERILE: Brand: MEDLINE

## (undated) DEVICE — DRP SURG U/DRP INVISISHIELD PA 48X52IN

## (undated) DEVICE — TRANSFER SET 3": Brand: MEDLINE INDUSTRIES, INC.

## (undated) DEVICE — APPL CHLORAPREP HI/LITE 26ML ORNG

## (undated) DEVICE — SUT VIC 2/0 CT1 CR8 18IN J839D

## (undated) DEVICE — YANKAUER,BULB TIP,W/O VENT,RIGID,STERILE: Brand: MEDLINE

## (undated) DEVICE — SYRINGE, LUER SLIP, STERILE, 60ML: Brand: MEDLINE

## (undated) DEVICE — VIAL FORMLN CAP 10PCT 20ML

## (undated) DEVICE — PREP SOL POVIDONE/IODINE BT 4OZ

## (undated) DEVICE — GLV SURG SENSICARE PI LF PF 7.0

## (undated) DEVICE — SYS IRR SURGIPHOR A/MIC RTU BO PVPI 450ML STRL

## (undated) DEVICE — FLEX ADVANTAGE 1500CC: Brand: FLEX ADVANTAGE

## (undated) DEVICE — ANTIBACTERIAL UNDYED BRAIDED (POLYGLACTIN 910), SYNTHETIC ABSORBABLE SUTURE: Brand: COATED VICRYL

## (undated) DEVICE — KT ORCA ORCAPOD DISP STRL

## (undated) DEVICE — Device: Brand: XPERIENCE

## (undated) DEVICE — PUMP PAIN AUTOFUSER AUTO SELCT NOBOLUS 1TO14ML/HR 550ML DISP

## (undated) DEVICE — WEREWOLF FASTSEAL 6.0 HEMOSTASIS WAND: Brand: FASTSEAL 6.0 HEMOSTASIS WAND

## (undated) DEVICE — SYS SKIN EXOFIN WND CLS 4X22CM

## (undated) DEVICE — SUT FW #2 W/TPR NDL 1/2 CIR 38IN 97CM 26.5MM BLU

## (undated) DEVICE — INTENDED FOR TISSUE SEPARATION, AND OTHER PROCEDURES THAT REQUIRE A SHARP SURGICAL BLADE TO PUNCTURE OR CUT.: Brand: BARD-PARKER ® STAINLESS STEEL BLADES

## (undated) DEVICE — 3M™ DURAPORE™ SURGICAL TAPE 2 INCHES X 10YARDS (5.0CM X 9.1M) 6ROLLS/CARTON 10CARTONS/CASE 1538-2: Brand: 3M™ DURAPORE™

## (undated) DEVICE — SYS CLS SKIN PREMIERPRO EXOFINFUSION 22CM

## (undated) DEVICE — PK TOTL 90

## (undated) DEVICE — FRAZIER SUCTION INSTRUMENT 10 FR W/CONTROL VENT & OBTURATOR: Brand: FRAZIER

## (undated) DEVICE — T-MAX DISPOSABLE FACE MASK 8 PER BOX

## (undated) DEVICE — 3M™ MEDIPORE™ H SOFT CLOTH SURGICAL TAPE 2864, 4 INCH X 10 YARD (10CM X 9,14M), 12 ROLLS/CASE: Brand: 3M™ MEDIPORE™

## (undated) DEVICE — ADAPT CLN SCPE ENDO PORPOISE BX/50 DISP

## (undated) DEVICE — PCH SURG INVISISHIELD FLD/COL W/DRN/PRT 20X6IN

## (undated) DEVICE — INTENT TO BE USED WITH SUTURE MATERIAL FOR TISSUE CLOSURE: Brand: RICHARD-ALLAN® NEEDLE 1/2 CIRCLE TAPER

## (undated) DEVICE — CONTAINER,SPECIMEN,OR STERILE,4OZ: Brand: MEDLINE

## (undated) DEVICE — STRAP,POSITIONING,KNEE/BODY,FOAM,4X60": Brand: MEDLINE

## (undated) DEVICE — IMMOB KN 3PNL DLX CANVS 22IN BLU

## (undated) DEVICE — SYR CONTRL LUERLOK 10CC

## (undated) DEVICE — ELECTRD NDL EZ CLN STD 2.75IN

## (undated) DEVICE — GOWN ISOL W/THUMB UNIV BLU BX/15

## (undated) DEVICE — GLV SURG SENSICARE PI PF LF 7 GRN STRL

## (undated) DEVICE — TUBING, SUCTION, 1/4" X 20', STRAIGHT: Brand: MEDLINE INDUSTRIES, INC.

## (undated) DEVICE — GOWN ,SIRUS,NONREINFORCED 3XL: Brand: MEDLINE

## (undated) DEVICE — SPNG GZ WOVN 4X4IN 12PLY 10/BX STRL

## (undated) DEVICE — NEEDLE, QUINCKE 22GX3.5": Brand: MEDLINE INDUSTRIES, INC.

## (undated) DEVICE — MAT FLR ABSORBENT LG 4FT 10 2.5FT

## (undated) DEVICE — WRAP SHLDR COMPR COLD/THERP

## (undated) DEVICE — DRAPE,TOWEL,LARGE,INVISISHIELD: Brand: MEDLINE

## (undated) DEVICE — CANN O2 ETCO2 FITS ALL CONN CO2 SMPL A/ 7IN DISP LF

## (undated) DEVICE — BNDG,ELSTC,MATRIX,STRL,4"X5YD,LF,HOOK&LP: Brand: MEDLINE

## (undated) DEVICE — COAXIAL FEMORAL CANAL TIP

## (undated) DEVICE — THE SINGLE USE ETRAP – POLYP TRAP IS USED FOR SUCTION RETRIEVAL OF ENDOSCOPICALLY REMOVED POLYPS.: Brand: ETRAP

## (undated) DEVICE — SUREFIT, DUAL DISPERSIVE ELECTRODE, CONTACT QUALITY MONITOR: Brand: SUREFIT

## (undated) DEVICE — IMPLANTABLE DEVICE
Type: IMPLANTABLE DEVICE | Site: SHOULDER | Status: NON-FUNCTIONAL
Brand: COMPREHENSIVE® REVERSE SHOULDER
Removed: 2025-02-14

## (undated) DEVICE — WEBRIL* CAST PADDING: Brand: DEROYAL

## (undated) DEVICE — ARGYLE FRAZIER SURGICAL SUCTION INSTRUMENT 10 FR/CH (3.3 MM): Brand: ARGYLE